# Patient Record
Sex: MALE | URBAN - METROPOLITAN AREA
[De-identification: names, ages, dates, MRNs, and addresses within clinical notes are randomized per-mention and may not be internally consistent; named-entity substitution may affect disease eponyms.]

---

## 2017-12-26 ENCOUNTER — OUTPATIENT (OUTPATIENT)
Dept: OUTPATIENT SERVICES | Facility: HOSPITAL | Age: 62
LOS: 1 days | Discharge: HOME | End: 2017-12-26
Payer: COMMERCIAL

## 2017-12-26 DIAGNOSIS — K43.9 VENTRAL HERNIA WITHOUT OBSTRUCTION OR GANGRENE: ICD-10-CM

## 2017-12-26 DIAGNOSIS — Z87.19 PERSONAL HISTORY OF OTHER DISEASES OF THE DIGESTIVE SYSTEM: Chronic | ICD-10-CM

## 2017-12-26 DIAGNOSIS — Z98.890 OTHER SPECIFIED POSTPROCEDURAL STATES: Chronic | ICD-10-CM

## 2017-12-26 LAB
ANION GAP SERPL CALC-SCNC: 7 MMOL/L — SIGNIFICANT CHANGE UP (ref 5–17)
APTT BLD: 33.3 SEC — SIGNIFICANT CHANGE UP (ref 27.5–37.4)
BASOPHILS # BLD AUTO: 0.1 K/UL — SIGNIFICANT CHANGE UP (ref 0–0.2)
BASOPHILS NFR BLD AUTO: 1.1 % — SIGNIFICANT CHANGE UP (ref 0–2)
BUN SERPL-MCNC: 17 MG/DL — SIGNIFICANT CHANGE UP (ref 7–23)
CALCIUM SERPL-MCNC: 9 MG/DL — SIGNIFICANT CHANGE UP (ref 8.5–10.1)
CHLORIDE SERPL-SCNC: 105 MMOL/L — SIGNIFICANT CHANGE UP (ref 96–108)
CO2 SERPL-SCNC: 28 MMOL/L — SIGNIFICANT CHANGE UP (ref 22–31)
CREAT SERPL-MCNC: 0.79 MG/DL — SIGNIFICANT CHANGE UP (ref 0.5–1.3)
EOSINOPHIL # BLD AUTO: 0.4 K/UL — SIGNIFICANT CHANGE UP (ref 0–0.5)
EOSINOPHIL NFR BLD AUTO: 7.1 % — HIGH (ref 0–6)
GLUCOSE SERPL-MCNC: 103 MG/DL — HIGH (ref 70–99)
HCT VFR BLD CALC: 44.4 % — SIGNIFICANT CHANGE UP (ref 39–50)
HGB BLD-MCNC: 15.1 G/DL — SIGNIFICANT CHANGE UP (ref 13–17)
INR BLD: 1.04 RATIO — SIGNIFICANT CHANGE UP (ref 0.88–1.16)
LYMPHOCYTES # BLD AUTO: 1.5 K/UL — SIGNIFICANT CHANGE UP (ref 1–3.3)
LYMPHOCYTES # BLD AUTO: 29.4 % — SIGNIFICANT CHANGE UP (ref 13–44)
MCHC RBC-ENTMCNC: 32.4 PG — SIGNIFICANT CHANGE UP (ref 27–34)
MCHC RBC-ENTMCNC: 34 GM/DL — SIGNIFICANT CHANGE UP (ref 32–36)
MCV RBC AUTO: 95.4 FL — SIGNIFICANT CHANGE UP (ref 80–100)
MONOCYTES # BLD AUTO: 0.4 K/UL — SIGNIFICANT CHANGE UP (ref 0–0.9)
MONOCYTES NFR BLD AUTO: 7.8 % — SIGNIFICANT CHANGE UP (ref 2–14)
NEUTROPHILS # BLD AUTO: 2.8 K/UL — SIGNIFICANT CHANGE UP (ref 1.8–7.4)
NEUTROPHILS NFR BLD AUTO: 54.6 % — SIGNIFICANT CHANGE UP (ref 43–77)
PLATELET # BLD AUTO: 143 K/UL — LOW (ref 150–400)
POTASSIUM SERPL-MCNC: 4.1 MMOL/L — SIGNIFICANT CHANGE UP (ref 3.5–5.3)
POTASSIUM SERPL-SCNC: 4.1 MMOL/L — SIGNIFICANT CHANGE UP (ref 3.5–5.3)
PROTHROM AB SERPL-ACNC: 11.2 SEC — SIGNIFICANT CHANGE UP (ref 9.8–12.7)
RBC # BLD: 4.65 M/UL — SIGNIFICANT CHANGE UP (ref 4.2–5.8)
RBC # FLD: 10.8 % — SIGNIFICANT CHANGE UP (ref 10.3–14.5)
SODIUM SERPL-SCNC: 140 MMOL/L — SIGNIFICANT CHANGE UP (ref 135–145)
WBC # BLD: 5.1 K/UL — SIGNIFICANT CHANGE UP (ref 3.8–10.5)
WBC # FLD AUTO: 5.1 K/UL — SIGNIFICANT CHANGE UP (ref 3.8–10.5)

## 2017-12-26 PROCEDURE — 93010 ELECTROCARDIOGRAM REPORT: CPT

## 2017-12-26 NOTE — CHART NOTE - NSCHARTNOTEFT_GEN_A_CORE
Plan  1. Stop all NSAIDS, herbal supplements and vitamins for 7 days.  2. NPO at midnight.  3. Take the following medications (losartan, metoprolol) with small sips of water on the morning of your procedure/surgery.  4. Use EZ sponges as directed  5. Use mupirocin as directed

## 2018-01-11 ENCOUNTER — OUTPATIENT (OUTPATIENT)
Dept: OUTPATIENT SERVICES | Facility: HOSPITAL | Age: 63
LOS: 1 days | Discharge: HOME | End: 2018-01-11

## 2018-01-11 VITALS
RESPIRATION RATE: 16 BRPM | TEMPERATURE: 98 F | DIASTOLIC BLOOD PRESSURE: 85 MMHG | SYSTOLIC BLOOD PRESSURE: 140 MMHG | HEART RATE: 74 BPM | WEIGHT: 229.94 LBS | HEIGHT: 75 IN | OXYGEN SATURATION: 97 %

## 2018-01-11 VITALS
SYSTOLIC BLOOD PRESSURE: 132 MMHG | DIASTOLIC BLOOD PRESSURE: 69 MMHG | HEART RATE: 59 BPM | OXYGEN SATURATION: 100 % | RESPIRATION RATE: 16 BRPM

## 2018-01-11 DIAGNOSIS — Z98.890 OTHER SPECIFIED POSTPROCEDURAL STATES: Chronic | ICD-10-CM

## 2018-01-11 DIAGNOSIS — Z87.19 PERSONAL HISTORY OF OTHER DISEASES OF THE DIGESTIVE SYSTEM: Chronic | ICD-10-CM

## 2018-01-11 RX ORDER — ONDANSETRON 8 MG/1
4 TABLET, FILM COATED ORAL ONCE
Qty: 0 | Refills: 0 | Status: DISCONTINUED | OUTPATIENT
Start: 2018-01-11 | End: 2018-01-11

## 2018-01-11 RX ORDER — FENTANYL CITRATE 50 UG/ML
50 INJECTION INTRAVENOUS
Qty: 0 | Refills: 0 | Status: DISCONTINUED | OUTPATIENT
Start: 2018-01-11 | End: 2018-01-11

## 2018-01-11 RX ORDER — OXYCODONE HYDROCHLORIDE 5 MG/1
1 TABLET ORAL
Qty: 30 | Refills: 0
Start: 2018-01-11 | End: 2018-01-15

## 2018-01-11 RX ORDER — OXYCODONE HYDROCHLORIDE 5 MG/1
10 TABLET ORAL EVERY 6 HOURS
Qty: 0 | Refills: 0 | Status: DISCONTINUED | OUTPATIENT
Start: 2018-01-11 | End: 2018-01-11

## 2018-01-11 RX ORDER — OXYCODONE HYDROCHLORIDE 5 MG/1
5 TABLET ORAL EVERY 4 HOURS
Qty: 0 | Refills: 0 | Status: DISCONTINUED | OUTPATIENT
Start: 2018-01-11 | End: 2018-01-11

## 2018-01-11 RX ORDER — SODIUM CHLORIDE 9 MG/ML
1000 INJECTION, SOLUTION INTRAVENOUS
Qty: 0 | Refills: 0 | Status: DISCONTINUED | OUTPATIENT
Start: 2018-01-11 | End: 2018-01-11

## 2018-01-11 NOTE — ASU DISCHARGE PLAN (ADULT/PEDIATRIC). - MEDICATION SUMMARY - MEDICATIONS TO TAKE
I will START or STAY ON the medications listed below when I get home from the hospital:    oxyCODONE 5 mg oral tablet  -- 1 tab(s) by mouth every 4 hours, As Needed -for moderate pain MDD:6  -- Caution federal law prohibits the transfer of this drug to any person other  than the person for whom it was prescribed.  It is very important that you take or use this exactly as directed.  Do not skip doses or discontinue unless directed by your doctor.  May cause drowsiness.  Alcohol may intensify this effect.  Use care when operating dangerous machinery.  This prescription cannot be refilled.  Using more of this medication than prescribed may cause serious breathing problems.    -- Indication: For VENTRAL HERNIA WITHOUT OBSTRUCTION OR GANGRENE    losartan 50 mg oral tablet  -- 1 tab(s) by mouth once a day  -- Indication: For VENTRAL HERNIA WITHOUT OBSTRUCTION OR GANGRENE    simvastatin 20 mg oral tablet  -- 1 tab(s) by mouth once a day (at bedtime)  -- Indication: For VENTRAL HERNIA WITHOUT OBSTRUCTION OR GANGRENE    metoprolol tartrate 50 mg oral tablet  -- 1 tab(s) by mouth 2 times a day  -- Indication: For VENTRAL HERNIA WITHOUT OBSTRUCTION OR GANGRENE

## 2018-01-11 NOTE — BRIEF OPERATIVE NOTE - PROCEDURE
<<-----Click on this checkbox to enter Procedure Ventral hernia repair with mesh  01/11/2018    Active  RZINGALE

## 2018-01-16 DIAGNOSIS — K43.9 VENTRAL HERNIA WITHOUT OBSTRUCTION OR GANGRENE: ICD-10-CM

## 2018-01-16 DIAGNOSIS — E78.00 PURE HYPERCHOLESTEROLEMIA, UNSPECIFIED: ICD-10-CM

## 2018-01-16 DIAGNOSIS — Z88.8 ALLERGY STATUS TO OTHER DRUGS, MEDICAMENTS AND BIOLOGICAL SUBSTANCES: ICD-10-CM

## 2018-01-16 DIAGNOSIS — I10 ESSENTIAL (PRIMARY) HYPERTENSION: ICD-10-CM

## 2018-06-27 ENCOUNTER — RESULT REVIEW (OUTPATIENT)
Age: 63
End: 2018-06-27

## 2019-07-15 PROBLEM — I10 ESSENTIAL (PRIMARY) HYPERTENSION: Chronic | Status: ACTIVE | Noted: 2017-12-26

## 2019-07-15 PROBLEM — E78.00 PURE HYPERCHOLESTEROLEMIA, UNSPECIFIED: Chronic | Status: ACTIVE | Noted: 2017-12-26

## 2019-07-15 PROBLEM — Z00.00 ENCOUNTER FOR PREVENTIVE HEALTH EXAMINATION: Status: ACTIVE | Noted: 2019-07-15

## 2019-07-15 PROBLEM — L40.9 PSORIASIS, UNSPECIFIED: Chronic | Status: ACTIVE | Noted: 2017-12-26

## 2019-07-15 PROBLEM — K43.9 VENTRAL HERNIA WITHOUT OBSTRUCTION OR GANGRENE: Chronic | Status: ACTIVE | Noted: 2017-12-26

## 2019-08-27 ENCOUNTER — APPOINTMENT (OUTPATIENT)
Dept: GASTROENTEROLOGY | Facility: CLINIC | Age: 64
End: 2019-08-27
Payer: COMMERCIAL

## 2019-08-27 VITALS
HEIGHT: 72 IN | DIASTOLIC BLOOD PRESSURE: 82 MMHG | SYSTOLIC BLOOD PRESSURE: 139 MMHG | WEIGHT: 235 LBS | HEART RATE: 73 BPM | BODY MASS INDEX: 31.83 KG/M2

## 2019-08-27 PROCEDURE — 99212 OFFICE O/P EST SF 10 MIN: CPT

## 2019-09-05 NOTE — HISTORY OF PRESENT ILLNESS
[de-identified] : 65yo male with hx polyps\par Pt last year with large tubulovillous adenoma and other large polyps\par pt asymptomatic without bleeding or change in bowel habits

## 2019-09-05 NOTE — PHYSICAL EXAM
[Outer Ear] : the ears and nose were normal in appearance [Neck Cervical Mass (___cm)] : no neck mass was observed [Neck Appearance] : the appearance of the neck was normal [Oropharynx] : the oropharynx was normal [Thyroid Nodule] : there were no palpable thyroid nodules [Jugular Venous Distention Increased] : there was no jugular-venous distention [Thyroid Diffuse Enlargement] : the thyroid was not enlarged [Auscultation Breath Sounds / Voice Sounds] : lungs were clear to auscultation bilaterally [Heart Sounds Gallop] : no gallops [Heart Sounds] : normal S1 and S2 [Heart Rate And Rhythm] : heart rate was normal and rhythm regular [Heart Sounds Pericardial Friction Rub] : no pericardial rub [Murmurs] : no murmurs [Edema] : there was no peripheral edema [Full Pulse] : the pedal pulses are present [Bowel Sounds] : normal bowel sounds [] : no hepato-splenomegaly [Abdomen Soft] : soft [Abdomen Tenderness] : non-tender [Abdomen Mass (___ Cm)] : no abdominal mass palpated [Abnormal Walk] : normal gait [Motor Tone] : muscle strength and tone were normal [Musculoskeletal - Swelling] : no joint swelling seen [Nail Clubbing] : no clubbing  or cyanosis of the fingernails [Oriented To Time, Place, And Person] : oriented to person, place, and time [Impaired Insight] : insight and judgment were intact [Affect] : the affect was normal [General Appearance - Alert] : alert [General Appearance - In No Acute Distress] : in no acute distress [FreeTextEntry1] : psoriasis

## 2019-09-05 NOTE — ASSESSMENT
[FreeTextEntry1] : 63yo male with hx large tubulovillous adenoma and adenoma\par Will check colonoscopy\par Risks and benefits of procedure(s) discussed with patient in detail, including but not limited to, perforation, bleeding, reaction to anesthesia, missed lesions.\par

## 2019-09-06 ENCOUNTER — RESULT REVIEW (OUTPATIENT)
Age: 64
End: 2019-09-06

## 2019-09-06 ENCOUNTER — APPOINTMENT (OUTPATIENT)
Dept: GASTROENTEROLOGY | Facility: AMBULATORY MEDICAL SERVICES | Age: 64
End: 2019-09-06
Payer: COMMERCIAL

## 2019-09-06 PROCEDURE — 45385 COLONOSCOPY W/LESION REMOVAL: CPT

## 2019-09-06 PROCEDURE — 45380 COLONOSCOPY AND BIOPSY: CPT | Mod: 59

## 2020-05-06 ENCOUNTER — TRANSCRIPTION ENCOUNTER (OUTPATIENT)
Age: 65
End: 2020-05-06

## 2021-02-25 ENCOUNTER — NON-APPOINTMENT (OUTPATIENT)
Age: 66
End: 2021-02-25

## 2021-03-15 ENCOUNTER — APPOINTMENT (OUTPATIENT)
Dept: GASTROENTEROLOGY | Facility: CLINIC | Age: 66
End: 2021-03-15
Payer: COMMERCIAL

## 2021-03-15 VITALS
HEART RATE: 69 BPM | SYSTOLIC BLOOD PRESSURE: 170 MMHG | DIASTOLIC BLOOD PRESSURE: 90 MMHG | BODY MASS INDEX: 29.93 KG/M2 | HEIGHT: 72 IN | WEIGHT: 221 LBS | TEMPERATURE: 98 F

## 2021-03-15 DIAGNOSIS — Z12.11 ENCOUNTER FOR SCREENING FOR MALIGNANT NEOPLASM OF COLON: ICD-10-CM

## 2021-03-15 DIAGNOSIS — Z78.9 OTHER SPECIFIED HEALTH STATUS: ICD-10-CM

## 2021-03-15 DIAGNOSIS — Z82.49 FAMILY HISTORY OF ISCHEMIC HEART DISEASE AND OTHER DISEASES OF THE CIRCULATORY SYSTEM: ICD-10-CM

## 2021-03-15 DIAGNOSIS — Z86.79 PERSONAL HISTORY OF OTHER DISEASES OF THE CIRCULATORY SYSTEM: ICD-10-CM

## 2021-03-15 DIAGNOSIS — Z86.39 PERSONAL HISTORY OF OTHER ENDOCRINE, NUTRITIONAL AND METABOLIC DISEASE: ICD-10-CM

## 2021-03-15 PROCEDURE — 99212 OFFICE O/P EST SF 10 MIN: CPT

## 2021-03-15 PROCEDURE — 99072 ADDL SUPL MATRL&STAF TM PHE: CPT

## 2021-03-16 PROBLEM — Z86.79 HISTORY OF HYPERTENSION: Status: RESOLVED | Noted: 2021-03-15 | Resolved: 2021-03-16

## 2021-03-16 PROBLEM — Z82.49 FAMILY HISTORY OF HYPERTENSION: Status: ACTIVE | Noted: 2021-03-15

## 2021-03-16 PROBLEM — Z78.9 CAFFEINE USE: Status: ACTIVE | Noted: 2021-03-15

## 2021-03-16 PROBLEM — Z86.39 HISTORY OF HIGH CHOLESTEROL: Status: RESOLVED | Noted: 2021-03-15 | Resolved: 2021-03-16

## 2021-03-16 NOTE — ASSESSMENT
[FreeTextEntry1] : 64yo male with hx colon polyps\par \par Risks and benefits of procedure(s) discussed with patient in detail, including but not limited to, perforation, bleeding, reaction to anesthesia, missed lesions.\par Will continue antihypertensive meds through am of procedure

## 2021-03-16 NOTE — HISTORY OF PRESENT ILLNESS
[de-identified] : 64yo male for surveillance colonoscopy\par Patient with hx colon polyps on prior colonoscopy and due for surveillance colonoscopy\par Patient is asymptomatic without bleeding or change in bowel habits. he has hx mulitple large polyps removed piecemeal\par Pt with hypertension -

## 2021-03-27 ENCOUNTER — APPOINTMENT (OUTPATIENT)
Dept: DISASTER EMERGENCY | Facility: CLINIC | Age: 66
End: 2021-03-27

## 2021-03-27 DIAGNOSIS — Z01.818 ENCOUNTER FOR OTHER PREPROCEDURAL EXAMINATION: ICD-10-CM

## 2021-03-27 LAB — SARS-COV-2 N GENE NPH QL NAA+PROBE: NOT DETECTED

## 2021-03-30 ENCOUNTER — APPOINTMENT (OUTPATIENT)
Dept: GASTROENTEROLOGY | Facility: CLINIC | Age: 66
End: 2021-03-30
Payer: COMMERCIAL

## 2021-03-30 ENCOUNTER — RESULT REVIEW (OUTPATIENT)
Age: 66
End: 2021-03-30

## 2021-03-30 PROCEDURE — 45380 COLONOSCOPY AND BIOPSY: CPT

## 2021-03-30 PROCEDURE — 99072 ADDL SUPL MATRL&STAF TM PHE: CPT

## 2021-08-19 ENCOUNTER — TRANSCRIPTION ENCOUNTER (OUTPATIENT)
Age: 66
End: 2021-08-19

## 2021-09-08 ENCOUNTER — TRANSCRIPTION ENCOUNTER (OUTPATIENT)
Age: 66
End: 2021-09-08

## 2021-12-28 ENCOUNTER — OUTPATIENT (OUTPATIENT)
Dept: OUTPATIENT SERVICES | Facility: HOSPITAL | Age: 66
LOS: 1 days | End: 2021-12-28
Payer: COMMERCIAL

## 2021-12-28 DIAGNOSIS — Z87.19 PERSONAL HISTORY OF OTHER DISEASES OF THE DIGESTIVE SYSTEM: Chronic | ICD-10-CM

## 2021-12-28 DIAGNOSIS — Z20.828 CONTACT WITH AND (SUSPECTED) EXPOSURE TO OTHER VIRAL COMMUNICABLE DISEASES: ICD-10-CM

## 2021-12-28 DIAGNOSIS — Z98.890 OTHER SPECIFIED POSTPROCEDURAL STATES: Chronic | ICD-10-CM

## 2021-12-28 LAB — SARS-COV-2 RNA SPEC QL NAA+PROBE: SIGNIFICANT CHANGE UP

## 2021-12-28 PROCEDURE — C9803: CPT

## 2021-12-28 PROCEDURE — U0005: CPT

## 2021-12-28 PROCEDURE — U0003: CPT

## 2021-12-29 DIAGNOSIS — Z20.828 CONTACT WITH AND (SUSPECTED) EXPOSURE TO OTHER VIRAL COMMUNICABLE DISEASES: ICD-10-CM

## 2022-01-03 ENCOUNTER — APPOINTMENT (OUTPATIENT)
Dept: CARDIOLOGY | Facility: CLINIC | Age: 67
End: 2022-01-03
Payer: COMMERCIAL

## 2022-01-03 ENCOUNTER — NON-APPOINTMENT (OUTPATIENT)
Age: 67
End: 2022-01-03

## 2022-01-03 VITALS
DIASTOLIC BLOOD PRESSURE: 52 MMHG | HEIGHT: 72 IN | WEIGHT: 229 LBS | BODY MASS INDEX: 31.02 KG/M2 | HEART RATE: 101 BPM | SYSTOLIC BLOOD PRESSURE: 130 MMHG | OXYGEN SATURATION: 98 %

## 2022-01-03 DIAGNOSIS — E78.5 HYPERLIPIDEMIA, UNSPECIFIED: ICD-10-CM

## 2022-01-03 PROCEDURE — 93000 ELECTROCARDIOGRAM COMPLETE: CPT

## 2022-01-03 PROCEDURE — 99204 OFFICE O/P NEW MOD 45 MIN: CPT

## 2022-01-03 RX ORDER — APREMILAST 30 MG/1
30 TABLET, FILM COATED ORAL
Refills: 0 | Status: ACTIVE | COMMUNITY

## 2022-01-03 RX ORDER — ASPIRIN 81 MG
81 TABLET, DELAYED RELEASE (ENTERIC COATED) ORAL DAILY
Refills: 0 | Status: DISCONTINUED | COMMUNITY
End: 2022-01-03

## 2022-01-03 RX ORDER — SODIUM PICOSULFATE, MAGNESIUM OXIDE, AND ANHYDROUS CITRIC ACID 10; 3.5; 12 MG/160ML; G/160ML; G/160ML
10-3.5-12 MG-GM LIQUID ORAL
Qty: 2 | Refills: 0 | Status: DISCONTINUED | COMMUNITY
Start: 2019-08-27 | End: 2022-01-03

## 2022-01-03 RX ORDER — SIMVASTATIN 20 MG/1
20 TABLET, FILM COATED ORAL
Qty: 90 | Refills: 1 | Status: ACTIVE | COMMUNITY

## 2022-01-03 RX ORDER — SODIUM SULFATE, MAGNESIUM SULFATE, AND POTASSIUM CHLORIDE 17.75; 2.7; 2.25 G/1; G/1; G/1
1479-225-188 TABLET ORAL
Qty: 1 | Refills: 0 | Status: DISCONTINUED | COMMUNITY
Start: 2021-03-22 | End: 2022-01-03

## 2022-01-03 NOTE — DISCUSSION/SUMMARY
[FreeTextEntry1] : 66 year old patient with HTN but no prior cardiac history presents with rapid atrial fibrillation probably of several months duration.  CHADSVASC score of 2.  He will stop aspirin and start apixaban.  Metoprolol increased to 100 mg bid due to rapid rate.  Echo and TFT ordered.  EP eval for ASMITA cardioversion.

## 2022-01-03 NOTE — PHYSICAL EXAM

## 2022-01-03 NOTE — HISTORY OF PRESENT ILLNESS
[FreeTextEntry1] : 66 year old man with HTN and HLD.  Several months ago, noted palpitations and skipped beats that came and went.  Recently, these symptoms have been persistent.  He also has dyspnea walking up steps which is new.  No chest discomfort.

## 2022-01-04 ENCOUNTER — NON-APPOINTMENT (OUTPATIENT)
Age: 67
End: 2022-01-04

## 2022-01-07 ENCOUNTER — APPOINTMENT (OUTPATIENT)
Dept: ELECTROPHYSIOLOGY | Facility: CLINIC | Age: 67
End: 2022-01-07
Payer: COMMERCIAL

## 2022-01-07 VITALS
SYSTOLIC BLOOD PRESSURE: 132 MMHG | BODY MASS INDEX: 31.29 KG/M2 | HEIGHT: 72 IN | WEIGHT: 231 LBS | OXYGEN SATURATION: 97 % | DIASTOLIC BLOOD PRESSURE: 94 MMHG | HEART RATE: 99 BPM

## 2022-01-07 PROCEDURE — 93000 ELECTROCARDIOGRAM COMPLETE: CPT

## 2022-01-07 PROCEDURE — 99204 OFFICE O/P NEW MOD 45 MIN: CPT

## 2022-01-07 NOTE — DISCUSSION/SUMMARY
[FreeTextEntry1] : 66-year-old gentleman with history of recently persistent atrial fibrillation symptomatic with palpitations and exertional dyspnea patient has been compliant with eliquis 5 mg twice daily and continues to be on metoprolol 100 mg twice daily for rate control.\par Patient would benefit from rhythm control management reasonable to start amiodarone for short-term, DC cardioversion ASMITA guided, and plan for catheter ablation as a more effective therapy and avoid long-term amiodarone use.  \par Continue Eliquis, control HTN.  Weight loss aerobic exercise, reduce alcohol intake. \par \par Total length of time spent with this patient was 45 minutes and more then half of the time was spent face to face with the patient as well as counseling and coordination of care as stated above.\par

## 2022-01-07 NOTE — HISTORY OF PRESENT ILLNESS
[FreeTextEntry1] : 66-year-old male with history of hypertension recently diagnoses of persistent atrial fibrillation he had paroxysmal episodes of palpitations several months ago and the last 2 weeks symptoms are more persistent he also has exertional dyspnea.  Patient denies chest pain syncope dizziness.  Patient is on Eliquis 5 mg twice daily since 1/3/22. Denies any bleeding.  Also on metoprolol  mg twice daily for rate control. He drinks 2 glasses of wine daily. \par TSH wnl\par ecg 1.3.22 AF  bpm, narrow qrs

## 2022-01-13 RX ORDER — METOPROLOL TARTRATE 50 MG
1 TABLET ORAL
Qty: 0 | Refills: 0 | DISCHARGE

## 2022-01-13 RX ORDER — LOSARTAN POTASSIUM 100 MG/1
1 TABLET, FILM COATED ORAL
Qty: 0 | Refills: 0 | DISCHARGE

## 2022-01-14 ENCOUNTER — OUTPATIENT (OUTPATIENT)
Dept: OUTPATIENT SERVICES | Facility: HOSPITAL | Age: 67
LOS: 1 days | Discharge: HOME | End: 2022-01-14
Payer: COMMERCIAL

## 2022-01-14 VITALS
RESPIRATION RATE: 18 BRPM | DIASTOLIC BLOOD PRESSURE: 64 MMHG | SYSTOLIC BLOOD PRESSURE: 99 MMHG | OXYGEN SATURATION: 96 % | HEART RATE: 66 BPM

## 2022-01-14 VITALS
RESPIRATION RATE: 18 BRPM | SYSTOLIC BLOOD PRESSURE: 133 MMHG | OXYGEN SATURATION: 100 % | HEART RATE: 104 BPM | HEIGHT: 75 IN | DIASTOLIC BLOOD PRESSURE: 97 MMHG | TEMPERATURE: 98 F | WEIGHT: 227.96 LBS

## 2022-01-14 DIAGNOSIS — Z87.19 PERSONAL HISTORY OF OTHER DISEASES OF THE DIGESTIVE SYSTEM: Chronic | ICD-10-CM

## 2022-01-14 DIAGNOSIS — I48.91 UNSPECIFIED ATRIAL FIBRILLATION: ICD-10-CM

## 2022-01-14 DIAGNOSIS — Z98.890 OTHER SPECIFIED POSTPROCEDURAL STATES: Chronic | ICD-10-CM

## 2022-01-14 LAB
ANION GAP SERPL CALC-SCNC: 5 MMOL/L — SIGNIFICANT CHANGE UP (ref 5–17)
BUN SERPL-MCNC: 18 MG/DL — SIGNIFICANT CHANGE UP (ref 7–23)
CALCIUM SERPL-MCNC: 10 MG/DL — SIGNIFICANT CHANGE UP (ref 8.5–10.1)
CHLORIDE SERPL-SCNC: 103 MMOL/L — SIGNIFICANT CHANGE UP (ref 96–108)
CO2 SERPL-SCNC: 28 MMOL/L — SIGNIFICANT CHANGE UP (ref 22–31)
CREAT SERPL-MCNC: 0.9 MG/DL — SIGNIFICANT CHANGE UP (ref 0.5–1.3)
GLUCOSE SERPL-MCNC: 104 MG/DL — HIGH (ref 70–99)
HCT VFR BLD CALC: 46.3 % — SIGNIFICANT CHANGE UP (ref 39–50)
HGB BLD-MCNC: 15.9 G/DL — SIGNIFICANT CHANGE UP (ref 13–17)
MAGNESIUM SERPL-MCNC: 2.4 MG/DL — SIGNIFICANT CHANGE UP (ref 1.6–2.6)
MCHC RBC-ENTMCNC: 32.2 PG — SIGNIFICANT CHANGE UP (ref 27–34)
MCHC RBC-ENTMCNC: 34.3 GM/DL — SIGNIFICANT CHANGE UP (ref 32–36)
MCV RBC AUTO: 93.7 FL — SIGNIFICANT CHANGE UP (ref 80–100)
PLATELET # BLD AUTO: 191 K/UL — SIGNIFICANT CHANGE UP (ref 150–400)
POTASSIUM SERPL-MCNC: 4.6 MMOL/L — SIGNIFICANT CHANGE UP (ref 3.5–5.3)
POTASSIUM SERPL-SCNC: 4.6 MMOL/L — SIGNIFICANT CHANGE UP (ref 3.5–5.3)
RBC # BLD: 4.94 M/UL — SIGNIFICANT CHANGE UP (ref 4.2–5.8)
RBC # FLD: 11.7 % — SIGNIFICANT CHANGE UP (ref 10.3–14.5)
SODIUM SERPL-SCNC: 136 MMOL/L — SIGNIFICANT CHANGE UP (ref 135–145)
WBC # BLD: 7.23 K/UL — SIGNIFICANT CHANGE UP (ref 3.8–10.5)
WBC # FLD AUTO: 7.23 K/UL — SIGNIFICANT CHANGE UP (ref 3.8–10.5)

## 2022-01-14 PROCEDURE — 93320 DOPPLER ECHO COMPLETE: CPT

## 2022-01-14 PROCEDURE — 36415 COLL VENOUS BLD VENIPUNCTURE: CPT

## 2022-01-14 PROCEDURE — 93010 ELECTROCARDIOGRAM REPORT: CPT | Mod: 76

## 2022-01-14 PROCEDURE — 93312 ECHO TRANSESOPHAGEAL: CPT

## 2022-01-14 PROCEDURE — 92960 CARDIOVERSION ELECTRIC EXT: CPT

## 2022-01-14 PROCEDURE — 80048 BASIC METABOLIC PNL TOTAL CA: CPT

## 2022-01-14 PROCEDURE — 85027 COMPLETE CBC AUTOMATED: CPT

## 2022-01-14 PROCEDURE — 93325 DOPPLER ECHO COLOR FLOW MAPG: CPT

## 2022-01-14 PROCEDURE — 83735 ASSAY OF MAGNESIUM: CPT

## 2022-01-14 PROCEDURE — 93005 ELECTROCARDIOGRAM TRACING: CPT

## 2022-01-14 RX ORDER — METOPROLOL TARTRATE 50 MG
1 TABLET ORAL
Qty: 0 | Refills: 0 | DISCHARGE

## 2022-01-14 RX ORDER — METOPROLOL TARTRATE 50 MG
1 TABLET ORAL
Qty: 30 | Refills: 2
Start: 2022-01-14 | End: 2022-04-13

## 2022-01-14 RX ORDER — AMIODARONE HYDROCHLORIDE 400 MG/1
2 TABLET ORAL
Qty: 60 | Refills: 2
Start: 2022-01-14 | End: 2022-02-24

## 2022-01-14 NOTE — PROCEDURE NOTE - NSICDXPROCEDURE_GEN_ALL_CORE_FT
PROCEDURES:  Transesophageal echocardiography (ASMITA) with external cardioverison 14-Jan-2022 10:11:30  Abraham Soriano

## 2022-01-14 NOTE — PROCEDURE NOTE - ADDITIONAL PROCEDURE DETAILS
s/p ASMITA performed by  : [facility] LVEF, no evidence of LA/MYRON thrombus (full report to follow)  s/p successful DCCV to NSR.  - continue uninterrupted eliquis 5mg po BID  - decrease toprol to 25mg daily  - start amiodarone 400mg po daily for 2weeks then 200mg daily  - Black box warning of Amiodarone discussed with patient. Will need out patient monitoring of LFT's, TSH, Opthalmology evaluations and Pulmonary Function Tests.  - d/c home today, f/u with  on 2/14/22 @2:00pm  Plan d/w pt/.

## 2022-01-14 NOTE — PACU DISCHARGE NOTE - COMMENTS
Patient s/p ASMITA/CV. VS stable. Patient awake and alert at this time and denies pain. Patient verbalizes understanding of discharge instructions. Pending transport to the lobby at this time to meet his wife who will drive him home

## 2022-01-14 NOTE — PROCEDURAL SAFETY CHECKLIST WITH OR WITHOUT SEDATION - NSPOSTCOMMENTFT_GEN_ALL_CORE
Brief at   . Time out at   .Anesthesia at   . Probe in at   . Bubble Study at   . Probe out   . Cardioversion at   . Anesthesia end   . Brief at  0934. Time out at 0936. Anesthesia at 0939.  Probe in at 0943.  Bubble Study at 0954 . Probe out 1003. Cardioversion x1 at 200J at 1004. Patient was in AFib and converted to NSR. Anesthesia ended at 1010.

## 2022-01-20 DIAGNOSIS — E78.00 PURE HYPERCHOLESTEROLEMIA, UNSPECIFIED: ICD-10-CM

## 2022-01-20 DIAGNOSIS — I48.19 OTHER PERSISTENT ATRIAL FIBRILLATION: ICD-10-CM

## 2022-01-20 DIAGNOSIS — I10 ESSENTIAL (PRIMARY) HYPERTENSION: ICD-10-CM

## 2022-01-20 DIAGNOSIS — I48.92 UNSPECIFIED ATRIAL FLUTTER: ICD-10-CM

## 2022-01-20 DIAGNOSIS — Z79.01 LONG TERM (CURRENT) USE OF ANTICOAGULANTS: ICD-10-CM

## 2022-01-20 DIAGNOSIS — I25.10 ATHEROSCLEROTIC HEART DISEASE OF NATIVE CORONARY ARTERY WITHOUT ANGINA PECTORIS: ICD-10-CM

## 2022-01-20 DIAGNOSIS — I34.0 NONRHEUMATIC MITRAL (VALVE) INSUFFICIENCY: ICD-10-CM

## 2022-01-20 DIAGNOSIS — Z86.010 PERSONAL HISTORY OF COLONIC POLYPS: ICD-10-CM

## 2022-01-30 ENCOUNTER — NON-APPOINTMENT (OUTPATIENT)
Age: 67
End: 2022-01-30

## 2022-01-31 RX ORDER — METOPROLOL SUCCINATE 100 MG/1
100 TABLET, EXTENDED RELEASE ORAL
Qty: 180 | Refills: 3 | Status: COMPLETED | COMMUNITY
End: 2022-01-31

## 2022-02-10 ENCOUNTER — NON-APPOINTMENT (OUTPATIENT)
Age: 67
End: 2022-02-10

## 2022-02-10 ENCOUNTER — APPOINTMENT (OUTPATIENT)
Dept: ELECTROPHYSIOLOGY | Facility: CLINIC | Age: 67
End: 2022-02-10
Payer: COMMERCIAL

## 2022-02-10 VITALS
DIASTOLIC BLOOD PRESSURE: 81 MMHG | BODY MASS INDEX: 29.93 KG/M2 | OXYGEN SATURATION: 97 % | WEIGHT: 221 LBS | HEART RATE: 109 BPM | SYSTOLIC BLOOD PRESSURE: 121 MMHG | HEIGHT: 72 IN

## 2022-02-10 PROCEDURE — 99214 OFFICE O/P EST MOD 30 MIN: CPT

## 2022-02-10 PROCEDURE — 93000 ELECTROCARDIOGRAM COMPLETE: CPT

## 2022-02-10 NOTE — HISTORY OF PRESENT ILLNESS
[FreeTextEntry1] : 66-year-old male with history of hypertension recently diagnoses of persistent atrial fibrillation he had paroxysmal episodes of palpitations several months ago and the last 2 weeks symptoms are more persistent he also has exertional dyspnea.  Patient denies chest pain syncope dizziness.  Patient is on Eliquis 5 mg twice daily since 1/3/22. He underwent ASMITA guided DCCV and started on amiodarone on 1/14/2022. Denies any bleeding.  Also on metoprolol XL. He reduced drinking alcohol and had weight loss 10lb. He feels well, he may have some episodes of SR although today in AF\par TSH wnl\par ecg 2.10.22 Afib 100 bpm\par ecg 1.3.22 AF  bpm, narrow qrs

## 2022-02-10 NOTE — DISCUSSION/SUMMARY
[FreeTextEntry1] : 66-year-old gentleman with history of recently persistent atrial fibrillation symptomatic with palpitations and exertional dyspnea patient has been compliant with eliquis 5 mg twice daily s/p dcccv and amiodarone since 1/14/22. Has breakthrough AF. Rhythm control is beneficial.  \par Patient elects to undergo catheter ablation as a more effective therapy and avoid long-term amiodarone use.  \par Continue Eliquis, control HTN. Increase toprolol to 50mg daily. Weight loss aerobic exercise, reduce alcohol intake. \par hold eliquis and HTN meds day of procedure\par Total length of time spent with this patient was 30 minutes and more then half of the time was spent face to face with the patient as well as counseling and coordination of care as stated above.\par

## 2022-03-29 ENCOUNTER — OUTPATIENT (OUTPATIENT)
Dept: OUTPATIENT SERVICES | Facility: HOSPITAL | Age: 67
LOS: 1 days | End: 2022-03-29
Payer: COMMERCIAL

## 2022-03-29 VITALS
HEART RATE: 56 BPM | SYSTOLIC BLOOD PRESSURE: 130 MMHG | DIASTOLIC BLOOD PRESSURE: 66 MMHG | HEIGHT: 74 IN | OXYGEN SATURATION: 97 % | RESPIRATION RATE: 17 BRPM | TEMPERATURE: 98 F | WEIGHT: 227.08 LBS

## 2022-03-29 DIAGNOSIS — Z98.890 OTHER SPECIFIED POSTPROCEDURAL STATES: Chronic | ICD-10-CM

## 2022-03-29 DIAGNOSIS — Z13.89 ENCOUNTER FOR SCREENING FOR OTHER DISORDER: ICD-10-CM

## 2022-03-29 DIAGNOSIS — Z01.818 ENCOUNTER FOR OTHER PREPROCEDURAL EXAMINATION: ICD-10-CM

## 2022-03-29 DIAGNOSIS — I48.91 UNSPECIFIED ATRIAL FIBRILLATION: ICD-10-CM

## 2022-03-29 DIAGNOSIS — Z29.9 ENCOUNTER FOR PROPHYLACTIC MEASURES, UNSPECIFIED: ICD-10-CM

## 2022-03-29 DIAGNOSIS — Z87.19 PERSONAL HISTORY OF OTHER DISEASES OF THE DIGESTIVE SYSTEM: Chronic | ICD-10-CM

## 2022-03-29 DIAGNOSIS — I10 ESSENTIAL (PRIMARY) HYPERTENSION: ICD-10-CM

## 2022-03-29 LAB
ANION GAP SERPL CALC-SCNC: 12 MMOL/L — SIGNIFICANT CHANGE UP (ref 5–17)
APTT BLD: 39.9 SEC — HIGH (ref 27.5–35.5)
BASOPHILS # BLD AUTO: 0.02 K/UL — SIGNIFICANT CHANGE UP (ref 0–0.2)
BASOPHILS NFR BLD AUTO: 0.3 % — SIGNIFICANT CHANGE UP (ref 0–2)
BLD GP AB SCN SERPL QL: SIGNIFICANT CHANGE UP
BUN SERPL-MCNC: 14 MG/DL — SIGNIFICANT CHANGE UP (ref 8–20)
CALCIUM SERPL-MCNC: 9.8 MG/DL — SIGNIFICANT CHANGE UP (ref 8.6–10.2)
CHLORIDE SERPL-SCNC: 100 MMOL/L — SIGNIFICANT CHANGE UP (ref 98–107)
CO2 SERPL-SCNC: 28 MMOL/L — SIGNIFICANT CHANGE UP (ref 22–29)
CREAT SERPL-MCNC: 0.68 MG/DL — SIGNIFICANT CHANGE UP (ref 0.5–1.3)
EGFR: 101 ML/MIN/1.73M2 — SIGNIFICANT CHANGE UP
EOSINOPHIL # BLD AUTO: 0.3 K/UL — SIGNIFICANT CHANGE UP (ref 0–0.5)
EOSINOPHIL NFR BLD AUTO: 4.5 % — SIGNIFICANT CHANGE UP (ref 0–6)
GLUCOSE SERPL-MCNC: 99 MG/DL — SIGNIFICANT CHANGE UP (ref 70–99)
HCT VFR BLD CALC: 43.4 % — SIGNIFICANT CHANGE UP (ref 39–50)
HGB BLD-MCNC: 14.5 G/DL — SIGNIFICANT CHANGE UP (ref 13–17)
IMM GRANULOCYTES NFR BLD AUTO: 0.3 % — SIGNIFICANT CHANGE UP (ref 0–1.5)
INR BLD: 1.26 RATIO — HIGH (ref 0.88–1.16)
LYMPHOCYTES # BLD AUTO: 2 K/UL — SIGNIFICANT CHANGE UP (ref 1–3.3)
LYMPHOCYTES # BLD AUTO: 29.9 % — SIGNIFICANT CHANGE UP (ref 13–44)
MCHC RBC-ENTMCNC: 31.8 PG — SIGNIFICANT CHANGE UP (ref 27–34)
MCHC RBC-ENTMCNC: 33.4 GM/DL — SIGNIFICANT CHANGE UP (ref 32–36)
MCV RBC AUTO: 95.2 FL — SIGNIFICANT CHANGE UP (ref 80–100)
MONOCYTES # BLD AUTO: 0.51 K/UL — SIGNIFICANT CHANGE UP (ref 0–0.9)
MONOCYTES NFR BLD AUTO: 7.6 % — SIGNIFICANT CHANGE UP (ref 2–14)
NEUTROPHILS # BLD AUTO: 3.83 K/UL — SIGNIFICANT CHANGE UP (ref 1.8–7.4)
NEUTROPHILS NFR BLD AUTO: 57.4 % — SIGNIFICANT CHANGE UP (ref 43–77)
PLATELET # BLD AUTO: 169 K/UL — SIGNIFICANT CHANGE UP (ref 150–400)
POTASSIUM SERPL-MCNC: 4.5 MMOL/L — SIGNIFICANT CHANGE UP (ref 3.5–5.3)
POTASSIUM SERPL-SCNC: 4.5 MMOL/L — SIGNIFICANT CHANGE UP (ref 3.5–5.3)
PROTHROM AB SERPL-ACNC: 14.7 SEC — HIGH (ref 10.5–13.4)
RBC # BLD: 4.56 M/UL — SIGNIFICANT CHANGE UP (ref 4.2–5.8)
RBC # FLD: 12.3 % — SIGNIFICANT CHANGE UP (ref 10.3–14.5)
SODIUM SERPL-SCNC: 140 MMOL/L — SIGNIFICANT CHANGE UP (ref 135–145)
WBC # BLD: 6.68 K/UL — SIGNIFICANT CHANGE UP (ref 3.8–10.5)
WBC # FLD AUTO: 6.68 K/UL — SIGNIFICANT CHANGE UP (ref 3.8–10.5)

## 2022-03-29 PROCEDURE — G0463: CPT

## 2022-03-29 PROCEDURE — 93005 ELECTROCARDIOGRAM TRACING: CPT

## 2022-03-29 PROCEDURE — 93010 ELECTROCARDIOGRAM REPORT: CPT

## 2022-03-29 NOTE — H&P PST ADULT - PROBLEM SELECTOR PLAN 1
scheduled for Afib ablation with Dr. Maddie Arceo on 4/7/2022.     -Verbalized understanding of all PST instructions  - Will hold eliquis and antihypertensive medications the morning of procedure as noted by Dr. Perkins's instructions  -NPO after midnight except for meds the night before  -Tylenol if needed 1 week before procedure

## 2022-03-29 NOTE — H&P PST ADULT - PROBLEM SELECTOR PLAN 2
-Follows with PCP  -Will  hold medications the morning of procedure as noted by Dr. Perkins's instructions

## 2022-03-29 NOTE — H&P PST ADULT - MUSCULOSKELETAL
Oriented - self; Oriented - place; Oriented - time negative No joint pain, swelling or deformity; no limitation of movement

## 2022-03-29 NOTE — H&P PST ADULT - NSANTHOSAYNRD_GEN_A_CORE
No. FRANCIA screening performed.  STOP BANG Legend: 0-2 = LOW Risk; 3-4 = INTERMEDIATE Risk; 5-8 = HIGH Risk

## 2022-03-29 NOTE — H&P PST ADULT - NSICDXPASTMEDICALHX_GEN_ALL_CORE_FT
PAST MEDICAL HISTORY:  Afib     Epigastric hernia     HTN (hypertension)     Hypercholesterolemia     Psoriasis     Psoriasis      PAST MEDICAL HISTORY:  Afib     At risk for sleep apnea Bang score 3    Epigastric hernia     HTN (hypertension)     Hypercholesterolemia     Psoriasis     Psoriasis

## 2022-03-29 NOTE — H&P PST ADULT - HISTORY OF PRESENT ILLNESS
Reports having palpitations all year long in 2021 and disappeared. Reports that December 2021, palpitations and feeling of irregular heartbeat that remains throughout an entire day. /u with a a cardiologist who noted that he had a fib and was referred to Dr. Perkins. Started on eliquis twice per day since 1/2022 and amiodarone 1/14/2022. Denies any bleeding, dizziness, lightehdness. reports that he has been feeling much improvement since started amiodarone.  65 y/o male with PMH of HLD, HTN, epigastric hernia, psoriasis, Afib presents to PST today pending Afib ablation with Dr. Maddie Arceo on 4/7/2022. Reports having palpitations all year long in 2021 for short period of timewhich suddenly disappeared. Reports that on December 2021, palpitations and feeling of irregular heartbeat remains throughout an entire day and he f /u with a cardiologist who noted that he had a fib. Patient was then referred to Dr. Perkins. Started on eliquis twice per day since 1/2022 and amiodarone 1/14/2022. Denies any bleeding, dizziness, lightheadedness, SOB.  Reports that he has been feeling much improvement since started amiodarone.     EKG 3/29/2022- sinus bradycardia  Echocardiogram (date): 1/14/2022-Normal left ventricular systolic function, No thrombus in the left atrial appendage, MYRON velocity was 30 cm/sec, left atrium appears normal   Stress Test (date):N/a  Cardiac CT or MRI (date): n/a  Cardiac Cath (date): n/a  Cardiac surgery (date): 4/7/2022

## 2022-03-29 NOTE — H&P PST ADULT - ASSESSMENT
65 y/o male scheduled for Afib ablation with Dr. Maddie Arceo on 2022.     -Verbalized understanding of all PST instructions  - Will hold eliquis and antihypertensive medications the morning of procedure as noted by Dr. Perkins's instructions  -NPO after midnight except for meds the night before  -Tylenol if needed 1 week before procedure    OPIOID RISK TOOL    LORRIE EACH BOX THAT APPLIES AND ADD TOTALS AT THE END    FAMILY HISTORY OF SUBSTANCE ABUSE                 FEMALE         MALE                                                Alcohol                             [  ]1 pt          [  ]3pts                                               Illegal Durgs                     [  ]2 pts        [  ]3pts                                               Rx Drugs                           [  ]4 pts        [  ]4 pts    PERSONAL HISTORY OF SUBSTANCE ABUSE                                                                                          Alcohol                             [  ]3 pts       [  ]3 pts                                               Illegal Drugs                     [  ]4 pts        [  ]4 pts                                               Rx Drugs                           [  ]5 pts        [  ]5 pts    AGE BETWEEN 16-45 YEARS                                      [  ]1 pt         [  ]1 pt    HISTORY OF PREADOLESCENT   SEXUAL ABUSE                                                             [  ]3 pts        [  ]0pts    PSYCHOLOGICAL DISEASE                     ADD, OCD, Bipolar, Schizophrenia        [  ]2 pts         [  ]2 pts                      Depression                                               [  ]1 pt           [  ]1 pt           SCORING TOTAL   (add numbers and type here)              (0)                                     A score of 3 or lower indicated LOW risk for future opioid abuse  A score of 4 to 7 indicated moderate risk for future opioid abuse  A score of 8 or higher indicates a high risk for opioid abuse    CAPRINI SCORE [CLOT]    AGE RELATED RISK FACTORS                                                       MOBILITY RELATED FACTORS  [ ] Age 41-60 years                                            (1 Point)                  [ ] Bed rest                                                        (1 Point)  [x ] Age: 61-74 years                                           (2 Points)                 [ ] Plaster cast                                                   (2 Points)  [ ] Age= 75 years                                              (3 Points)                 [ ] Bed bound for more than 72 hours                 (2 Points)    DISEASE RELATED RISK FACTORS                                               GENDER SPECIFIC FACTORS  [ ] Edema in the lower extremities                       (1 Point)                  [ ] Pregnancy                                                     (1 Point)  [ ] Varicose veins                                               (1 Point)                  [ ] Post-partum < 6 weeks                                   (1 Point)             [ x] BMI > 25 Kg/m2                                            (1 Point)                  [ ] Hormonal therapy  or oral contraception          (1 Point)                 [ ] Sepsis (in the previous month)                        (1 Point)                  [ ] History of pregnancy complications                 (1 point)  [ ] Pneumonia or serious lung disease                                               [ ] Unexplained or recurrent                     (1 Point)           (in the previous month)                               (1 Point)  [ ] Abnormal pulmonary function test                     (1 Point)                 SURGERY RELATED RISK FACTORS  [ ] Acute myocardial infarction                              (1 Point)                 [ ]  Section                                             (1 Point)  [ ] Congestive heart failure (in the previous month)  (1 Point)               [ ] Minor surgery                                                  (1 Point)   [ ] Inflammatory bowel disease                             (1 Point)                 [ ] Arthroscopic surgery                                        (2 Points)  [ ] Central venous access                                      (2 Points)                [ x] General surgery lasting more than 45 minutes   (2 Points)       [ ] Stroke (in the previous month)                          (5 Points)               [ ] Elective arthroplasty                                         (5 Points)                                                                                                                                               HEMATOLOGY RELATED FACTORS                                                 TRAUMA RELATED RISK FACTORS  [ ] Prior episodes of VTE                                     (3 Points)                [ ] Fracture of the hip, pelvis, or leg                       (5 Points)  [ ] Positive family history for VTE                         (3 Points)                 [ ] Acute spinal cord injury (in the previous month)  (5 Points)  [ ] Prothrombin 93209 A                                     (3 Points)                 [ ] Paralysis  (less than 1 month)                             (5 Points)  [ ] Factor V Leiden                                             (3 Points)                  [ ] Multiple Trauma within 1 month                        (5 Points)  [ ] Lupus anticoagulants                                     (3 Points)                                                           [ ] Anticardiolipin antibodies                               (3 Points)                                                       [ ] High homocysteine in the blood                      (3 Points)                                             [ ] Other congenital or acquired thrombophilia      (3 Points)                                                [ ] Heparin induced thrombocytopenia                  (3 Points)                                          Total Score [  5        ]    Caprini Score 0 - 2:  Low Risk, No VTE Prophylaxis required for most patients, encourage ambulation  Caprini Score 3 - 6:  At Risk, pharmacologic VTE prophylaxis is indicated for most patients (in the absence of a contraindication)  Caprini Score Greater than or = 7:  High Risk, pharmacologic VTE prophylaxis is indicated for most patients (in the absence of a contraindication)

## 2022-03-29 NOTE — H&P PST ADULT - PROBLEM SELECTOR PLAN 4
Caprini score 5- Moderate risk  Surgical team assess/recommend mechanical/pharmacological measures for VTE prophylaxis

## 2022-04-07 ENCOUNTER — INPATIENT (INPATIENT)
Facility: HOSPITAL | Age: 67
LOS: 0 days | Discharge: HOME | DRG: 274 | End: 2022-04-08
Attending: INTERNAL MEDICINE | Admitting: INTERNAL MEDICINE
Payer: COMMERCIAL

## 2022-04-07 ENCOUNTER — TRANSCRIPTION ENCOUNTER (OUTPATIENT)
Age: 67
End: 2022-04-07

## 2022-04-07 VITALS
OXYGEN SATURATION: 99 % | HEART RATE: 63 BPM | TEMPERATURE: 98 F | RESPIRATION RATE: 16 BRPM | DIASTOLIC BLOOD PRESSURE: 70 MMHG | SYSTOLIC BLOOD PRESSURE: 162 MMHG

## 2022-04-07 DIAGNOSIS — Z98.890 OTHER SPECIFIED POSTPROCEDURAL STATES: Chronic | ICD-10-CM

## 2022-04-07 DIAGNOSIS — I48.91 UNSPECIFIED ATRIAL FIBRILLATION: ICD-10-CM

## 2022-04-07 DIAGNOSIS — Z87.19 PERSONAL HISTORY OF OTHER DISEASES OF THE DIGESTIVE SYSTEM: Chronic | ICD-10-CM

## 2022-04-07 LAB — ABO RH CONFIRMATION: SIGNIFICANT CHANGE UP

## 2022-04-07 RX ORDER — COLCHICINE 0.6 MG
0.6 TABLET ORAL DAILY
Refills: 0 | Status: DISCONTINUED | OUTPATIENT
Start: 2022-04-07 | End: 2022-04-08

## 2022-04-07 RX ORDER — HYDROCHLOROTHIAZIDE 25 MG
12.5 TABLET ORAL DAILY
Refills: 0 | Status: DISCONTINUED | OUTPATIENT
Start: 2022-04-07 | End: 2022-04-08

## 2022-04-07 RX ORDER — ACETAMINOPHEN 500 MG
650 TABLET ORAL EVERY 6 HOURS
Refills: 0 | Status: DISCONTINUED | OUTPATIENT
Start: 2022-04-07 | End: 2022-04-08

## 2022-04-07 RX ORDER — ALPRAZOLAM 0.25 MG
0.25 TABLET ORAL EVERY 6 HOURS
Refills: 0 | Status: DISCONTINUED | OUTPATIENT
Start: 2022-04-07 | End: 2022-04-08

## 2022-04-07 RX ORDER — LOSARTAN POTASSIUM 100 MG/1
50 TABLET, FILM COATED ORAL DAILY
Refills: 0 | Status: DISCONTINUED | OUTPATIENT
Start: 2022-04-07 | End: 2022-04-08

## 2022-04-07 RX ORDER — METOPROLOL TARTRATE 50 MG
25 TABLET ORAL DAILY
Refills: 0 | Status: DISCONTINUED | OUTPATIENT
Start: 2022-04-07 | End: 2022-04-08

## 2022-04-07 RX ORDER — PANTOPRAZOLE SODIUM 20 MG/1
40 TABLET, DELAYED RELEASE ORAL
Refills: 0 | Status: DISCONTINUED | OUTPATIENT
Start: 2022-04-07 | End: 2022-04-08

## 2022-04-07 RX ORDER — SIMVASTATIN 20 MG/1
20 TABLET, FILM COATED ORAL AT BEDTIME
Refills: 0 | Status: DISCONTINUED | OUTPATIENT
Start: 2022-04-07 | End: 2022-04-08

## 2022-04-07 RX ORDER — AMIODARONE HYDROCHLORIDE 400 MG/1
100 TABLET ORAL DAILY
Refills: 0 | Status: DISCONTINUED | OUTPATIENT
Start: 2022-04-07 | End: 2022-04-08

## 2022-04-07 RX ORDER — BENZOCAINE AND MENTHOL 5; 1 G/100ML; G/100ML
1 LIQUID ORAL
Refills: 0 | Status: DISCONTINUED | OUTPATIENT
Start: 2022-04-07 | End: 2022-04-08

## 2022-04-07 RX ORDER — APIXABAN 2.5 MG/1
5 TABLET, FILM COATED ORAL
Refills: 0 | Status: DISCONTINUED | OUTPATIENT
Start: 2022-04-07 | End: 2022-04-08

## 2022-04-07 RX ORDER — SUCRALFATE 1 G
1 TABLET ORAL
Refills: 0 | Status: DISCONTINUED | OUTPATIENT
Start: 2022-04-07 | End: 2022-04-08

## 2022-04-07 RX ADMIN — Medication 650 MILLIGRAM(S): at 20:00

## 2022-04-07 RX ADMIN — APIXABAN 5 MILLIGRAM(S): 2.5 TABLET, FILM COATED ORAL at 22:07

## 2022-04-07 RX ADMIN — SIMVASTATIN 20 MILLIGRAM(S): 20 TABLET, FILM COATED ORAL at 22:07

## 2022-04-07 RX ADMIN — PANTOPRAZOLE SODIUM 40 MILLIGRAM(S): 20 TABLET, DELAYED RELEASE ORAL at 18:47

## 2022-04-07 RX ADMIN — Medication 1 GRAM(S): at 18:47

## 2022-04-07 RX ADMIN — Medication 25 MILLIGRAM(S): at 18:47

## 2022-04-07 RX ADMIN — Medication 650 MILLIGRAM(S): at 19:19

## 2022-04-07 NOTE — DISCHARGE NOTE PROVIDER - NSDCMRMEDTOKEN_GEN_ALL_CORE_FT
amiodarone 200 mg oral tablet: 2 tabs (400mg)  orally once a day for 2 weeks followed by 1 tab (200mg) once a day.  Eliquis 5 mg oral tablet: 1 tab(s) orally 2 times a day  losartan-hydrochlorothiazide 50 mg-12.5 mg oral tablet: 1 tab(s) orally once a day  Metoprolol Succinate ER 25 mg oral tablet, extended release: 1 tab(s) orally once a day   Otezla 30 mg oral tablet: 1 tab(s) orally 2 times a day  simvastatin 20 mg oral tablet: 1 tab(s) orally once a day (at bedtime)   amiodarone 200 mg oral tablet: 0.5 tab(s) orally once a day  colchicine 0.6 mg oral tablet: 1 tab(s) orally once a day x 30 days   Eliquis 5 mg oral tablet: 1 tab(s) orally 2 times a day  losartan-hydrochlorothiazide 50 mg-12.5 mg oral tablet: 1 tab(s) orally once a day  Metoprolol Succinate ER 25 mg oral tablet, extended release: 1 tab(s) orally once a day   Otezla 30 mg oral tablet: 1 tab(s) orally 2 times a day  pantoprazole 40 mg oral delayed release tablet: 1 tab(s) orally 2 times a day x 14 days followed by 1 tab orally daily x 30 days.   simvastatin 20 mg oral tablet: 1 tab(s) orally once a day (at bedtime)  sucralfate 1 g/10 mL oral suspension: 10 milliliter(s) orally 2 times a day x 14 days

## 2022-04-07 NOTE — PROGRESS NOTE ADULT - SUBJECTIVE AND OBJECTIVE BOX
Presents for elective AF ablation. Denies changes in health status since PST 3/29/22. Patient specifically denies chest pain, shortness of breath at rest or with exertion, near/true syncope, fevers/chills, N/V/D, or other cardiac or constitutional symptoms.    Last dose Eliquis 4/6PM.   NPO since last PM.   Groin prep per protocol.

## 2022-04-07 NOTE — PROGRESS NOTE ADULT - SUBJECTIVE AND OBJECTIVE BOX
PROCEDURE(S): Radiofrequency Ablation of Atrial Fibrillation & Atrial Flutter    ELECTRPHYSIOLOGIST(S): Marielos Perkins MD         COMPLICATIONS:  none        DISPOSITION:  observation     CONDITION: stable      Pt doing well s/p atrial fibrillation ablation ([facility]I, CTI) via b/l [facility] access. Denies complaint.       MEDICATIONS  (STANDING):  aMIOdarone    Tablet 100 milliGRAM(s) Oral daily  apixaban 5 milliGRAM(s) Oral <User Schedule>  colchicine 0.6 milliGRAM(s) Oral daily  hydrochlorothiazide 12.5 milliGRAM(s) Oral daily  losartan 50 milliGRAM(s) Oral daily  metoprolol succinate ER 25 milliGRAM(s) Oral daily  pantoprazole    Tablet 40 milliGRAM(s) Oral two times a day  simvastatin 20 milliGRAM(s) Oral at bedtime  sucralfate suspension 1 Gram(s) Oral two times a day    MEDICATIONS  (PRN):  acetaminophen     Tablet .. 650 milliGRAM(s) Oral every 6 hours PRN Mild Pain (1 - 3)  ALPRAZolam 0.25 milliGRAM(s) Oral every 6 hours PRN anxiety and/or insomnia  aluminum hydroxide/magnesium hydroxide/simethicone Suspension 30 milliLiter(s) Oral every 4 hours PRN Dyspepsia  benzocaine 15 mG/menthol 3.6 mG Lozenge 1 Lozenge Oral every 2 hours PRN Sore Throat      Allergies  No Known Allergies      Exam:   T(C): 36.8 (04-07-22 @ 11:06), Max: 36.8 (04-07-22 @ 11:06)  HR: 95 (04-07-22 @ 17:55) (63 - 103)  BP: 120/59 (04-07-22 @ 17:55) (120/58 - 162/70)  RR: 16 (04-07-22 @ 17:55) (15 - 16)  SpO2: 94% (04-07-22 @ 17:55) (94% - 99%)    VSS, NAD, A&O x 3  Card: S1/S2, RRR, no m/g/r  Resp: lungs CTA b/l  Abd: S/NT/ND  Groins: hemostatic sutures in place; sites C/D/I; no bleeding, hematoma, erythema, exudate or edema  Ext: no edema; distal pulses intact    I/Os: net + 1875mL    ECG: sinus rhythm w/ incomplete RBBB (QRS ~105ms), otherwise normal intervals; normal axis     Assessment:   66y Male h/o  HLD, HTN, epigastric hernia, psoriasis, persistent atrial fibrillation s/p ASMITA, cardioversion and amiodarone load.  Now status post uncomplicated radiofrequency ablation of atrial fibrillation ([facility]/PVI, CTI) via b/l [facility] access.      Plan:   Bedrest x 4 hours, then OOB with assistance and progress as tolerated.   Groin sutures to be removed by EP service in AM.   Pending groin status: Eliquis 5mg PO q 12 hours to resume at 2030 tonight.   Decrease amiodarone [id number] daily.   Continue other home medications.   Start Protonix 40mg twice daily x 2 weeks, then once daily x 6 weeks.   Start Carafate 1gm BID x 2 weeks.   Start colchicine 0.6mg daily x 1 month as tolerated.   Strict I/Os.  Please encourage incentive spirometry and ambulation once able.  Observation and monitoring on telemetry overnight with anticipated discharge in the AM and outpt follow up in 2-4 weeks.

## 2022-04-07 NOTE — DISCHARGE NOTE PROVIDER - NSDCCPTREATMENT_GEN_ALL_CORE_FT
PRINCIPAL PROCEDURE  Procedure: Electrophysiology study with ablation of focus atrial fibrillation  Findings and Treatment: - Bruising at the groin, sometimes extending down the leg, and/or a small lump under the skin at the groin access site is normal and will resolve within 2 – 3 weeks.   - Occasional skipped beats or palpitations that last for a few beats are common and generally resolve within 1-2 months.   - You make walk and take stairs at a regular pace.   - Do not perform any exercise more strenuous than walking for 1 week.   - Do not strain or lift heavy objects for 1 week.  - You may shower the day after the procedure.  - Do not soak in water (such as tub baths, hot tubs, swimming, etc.) for 1 week.   - You may resume all other activities the day after the procedure.  Call your doctor if:   - you notice bleeding, redness, drainage, swelling, increased tenderness or a hot sensation around the catheter insertion site.   - your temperature is greater than 100 degrees F for more than 24 hours.  - your rapid heart rhythm returns.  - you have any questions or concerns regarding the procedure.  If significant bleeding and/or a large lump (the size of a golf ball or bigger) occurs:  - Lie flat and apply continuous direct pressure just above the puncture site for at least 10 minutes  - If the issue resolves, notify your physician immediately.    - If the bleeding cannot be controlled, please seek immediate medical attention.  If you experience increased difficulty breathing or chest pain, or if you faint or have dizzy spells, please seek immediate medical attention.

## 2022-04-07 NOTE — DISCHARGE NOTE PROVIDER - NSDCFUADDINST_GEN_ALL_CORE_FT
Follow up with Dr. Perkins as scheduled 5/6/22.  Follow up with Dr. Perkins as scheduled 5/6/22.   Reduce your Amiodarone to 100mg daily

## 2022-04-07 NOTE — DISCHARGE NOTE PROVIDER - HOSPITAL COURSE
66y Male h/o  HLD, HTN, epigastric hernia, psoriasis, persistent atrial fibrillation s/p ASMITA, cardioversion and amiodarone load.  He presented electively 4/7/22 and underwent uncomplicated radiofrequency ablation of atrial fibrillation ([facility]/PVI, CTI) via b/l FV access.

## 2022-04-08 ENCOUNTER — TRANSCRIPTION ENCOUNTER (OUTPATIENT)
Age: 67
End: 2022-04-08

## 2022-04-08 VITALS — TEMPERATURE: 98 F

## 2022-04-08 LAB
ALBUMIN SERPL ELPH-MCNC: 4.1 G/DL — SIGNIFICANT CHANGE UP (ref 3.3–5.2)
ALP SERPL-CCNC: 57 U/L — SIGNIFICANT CHANGE UP (ref 40–120)
ALT FLD-CCNC: 24 U/L — SIGNIFICANT CHANGE UP
ANION GAP SERPL CALC-SCNC: 11 MMOL/L — SIGNIFICANT CHANGE UP (ref 5–17)
AST SERPL-CCNC: 31 U/L — SIGNIFICANT CHANGE UP
BILIRUB SERPL-MCNC: 0.4 MG/DL — SIGNIFICANT CHANGE UP (ref 0.4–2)
BUN SERPL-MCNC: 14.6 MG/DL — SIGNIFICANT CHANGE UP (ref 8–20)
CALCIUM SERPL-MCNC: 8.9 MG/DL — SIGNIFICANT CHANGE UP (ref 8.6–10.2)
CHLORIDE SERPL-SCNC: 104 MMOL/L — SIGNIFICANT CHANGE UP (ref 98–107)
CO2 SERPL-SCNC: 26 MMOL/L — SIGNIFICANT CHANGE UP (ref 22–29)
CREAT SERPL-MCNC: 0.64 MG/DL — SIGNIFICANT CHANGE UP (ref 0.5–1.3)
EGFR: 103 ML/MIN/1.73M2 — SIGNIFICANT CHANGE UP
GLUCOSE SERPL-MCNC: 118 MG/DL — HIGH (ref 70–99)
HCT VFR BLD CALC: 41.2 % — SIGNIFICANT CHANGE UP (ref 39–50)
HGB BLD-MCNC: 14 G/DL — SIGNIFICANT CHANGE UP (ref 13–17)
MAGNESIUM SERPL-MCNC: 2.2 MG/DL — SIGNIFICANT CHANGE UP (ref 1.6–2.6)
MCHC RBC-ENTMCNC: 31.7 PG — SIGNIFICANT CHANGE UP (ref 27–34)
MCHC RBC-ENTMCNC: 34 GM/DL — SIGNIFICANT CHANGE UP (ref 32–36)
MCV RBC AUTO: 93.2 FL — SIGNIFICANT CHANGE UP (ref 80–100)
PLATELET # BLD AUTO: 142 K/UL — LOW (ref 150–400)
POTASSIUM SERPL-MCNC: 4.2 MMOL/L — SIGNIFICANT CHANGE UP (ref 3.5–5.3)
POTASSIUM SERPL-SCNC: 4.2 MMOL/L — SIGNIFICANT CHANGE UP (ref 3.5–5.3)
PROT SERPL-MCNC: 6.7 G/DL — SIGNIFICANT CHANGE UP (ref 6.6–8.7)
RBC # BLD: 4.42 M/UL — SIGNIFICANT CHANGE UP (ref 4.2–5.8)
RBC # FLD: 12.1 % — SIGNIFICANT CHANGE UP (ref 10.3–14.5)
SODIUM SERPL-SCNC: 141 MMOL/L — SIGNIFICANT CHANGE UP (ref 135–145)
TSH SERPL-MCNC: 0.28 UIU/ML — SIGNIFICANT CHANGE UP (ref 0.27–4.2)
WBC # BLD: 8.49 K/UL — SIGNIFICANT CHANGE UP (ref 3.8–10.5)
WBC # FLD AUTO: 8.49 K/UL — SIGNIFICANT CHANGE UP (ref 3.8–10.5)

## 2022-04-08 PROCEDURE — 83735 ASSAY OF MAGNESIUM: CPT

## 2022-04-08 PROCEDURE — C1732: CPT

## 2022-04-08 PROCEDURE — 93623 PRGRMD STIMJ&PACG IV RX NFS: CPT

## 2022-04-08 PROCEDURE — C1889: CPT

## 2022-04-08 PROCEDURE — 99236 HOSP IP/OBS SAME DATE HI 85: CPT

## 2022-04-08 PROCEDURE — 93655 ICAR CATH ABLTJ DSCRT ARRHYT: CPT

## 2022-04-08 PROCEDURE — 84443 ASSAY THYROID STIM HORMONE: CPT

## 2022-04-08 PROCEDURE — C1731: CPT

## 2022-04-08 PROCEDURE — 36415 COLL VENOUS BLD VENIPUNCTURE: CPT

## 2022-04-08 PROCEDURE — C1894: CPT

## 2022-04-08 PROCEDURE — 85027 COMPLETE CBC AUTOMATED: CPT

## 2022-04-08 PROCEDURE — C1759: CPT

## 2022-04-08 PROCEDURE — C1769: CPT

## 2022-04-08 PROCEDURE — 80053 COMPREHEN METABOLIC PANEL: CPT

## 2022-04-08 PROCEDURE — 93656 COMPRE EP EVAL ABLTJ ATR FIB: CPT

## 2022-04-08 PROCEDURE — 93005 ELECTROCARDIOGRAM TRACING: CPT

## 2022-04-08 PROCEDURE — C1766: CPT

## 2022-04-08 PROCEDURE — 93010 ELECTROCARDIOGRAM REPORT: CPT

## 2022-04-08 RX ORDER — AMIODARONE HYDROCHLORIDE 400 MG/1
0.5 TABLET ORAL
Qty: 0 | Refills: 0 | DISCHARGE
Start: 2022-04-08

## 2022-04-08 RX ORDER — PANTOPRAZOLE SODIUM 20 MG/1
1 TABLET, DELAYED RELEASE ORAL
Qty: 58 | Refills: 0
Start: 2022-04-08 | End: 2022-04-21

## 2022-04-08 RX ORDER — SUCRALFATE 1 G
10 TABLET ORAL
Qty: 280 | Refills: 0
Start: 2022-04-08 | End: 2022-04-21

## 2022-04-08 RX ORDER — COLCHICINE 0.6 MG
1 TABLET ORAL
Qty: 30 | Refills: 0
Start: 2022-04-08 | End: 2022-05-07

## 2022-04-08 RX ADMIN — Medication 1 GRAM(S): at 05:38

## 2022-04-08 RX ADMIN — APIXABAN 5 MILLIGRAM(S): 2.5 TABLET, FILM COATED ORAL at 08:27

## 2022-04-08 RX ADMIN — LOSARTAN POTASSIUM 50 MILLIGRAM(S): 100 TABLET, FILM COATED ORAL at 05:38

## 2022-04-08 RX ADMIN — Medication 12.5 MILLIGRAM(S): at 05:38

## 2022-04-08 RX ADMIN — PANTOPRAZOLE SODIUM 40 MILLIGRAM(S): 20 TABLET, DELAYED RELEASE ORAL at 05:37

## 2022-04-08 RX ADMIN — Medication 25 MILLIGRAM(S): at 05:38

## 2022-04-08 RX ADMIN — AMIODARONE HYDROCHLORIDE 100 MILLIGRAM(S): 400 TABLET ORAL at 05:38

## 2022-04-08 NOTE — PROGRESS NOTE ADULT - SUBJECTIVE AND OBJECTIVE BOX
Patient seen today in bed. Figure 8 sutures removed from right and left groin without complication. No overnight complaints.     EKG: pending  TELE: SR. No events.     MEDICATIONS  (STANDING):  aMIOdarone    Tablet 100 milliGRAM(s) Oral daily  apixaban 5 milliGRAM(s) Oral <User Schedule>  colchicine 0.6 milliGRAM(s) Oral daily  hydrochlorothiazide 12.5 milliGRAM(s) Oral daily  losartan 50 milliGRAM(s) Oral daily  metoprolol succinate ER 25 milliGRAM(s) Oral daily  pantoprazole    Tablet 40 milliGRAM(s) Oral two times a day  simvastatin 20 milliGRAM(s) Oral at bedtime  sucralfate suspension 1 Gram(s) Oral two times a day    MEDICATIONS  (PRN):  acetaminophen     Tablet .. 650 milliGRAM(s) Oral every 6 hours PRN Mild Pain (1 - 3)  ALPRAZolam 0.25 milliGRAM(s) Oral every 6 hours PRN anxiety and/or insomnia  aluminum hydroxide/magnesium hydroxide/simethicone Suspension 30 milliLiter(s) Oral every 4 hours PRN Dyspepsia  benzocaine 15 mG/menthol 3.6 mG Lozenge 1 Lozenge Oral every 2 hours PRN Sore Throat    Allergies  No Known Allergies    PAST MEDICAL & SURGICAL HISTORY:  Epigastric hernia  HTN (hypertension)  Hypercholesterolemia  Psoriasis  Afib  Psoriasis  At risk for sleep apnea  Bang score 3  H/O inguinal hernia  left 1994, 2004 right,2019  H/O colonoscopy  2013    Vital Signs Last 24 Hrs  T(C): 36.5 (08 Apr 2022 05:34), Max: 36.8 (07 Apr 2022 11:06)  T(F): 97.7 (08 Apr 2022 05:34), Max: 98.2 (07 Apr 2022 11:06)  HR: 88 (08 Apr 2022 05:34) (63 - 103)  BP: 121/68 (08 Apr 2022 05:34) (113/59 - 162/70)  RR: 16 (08 Apr 2022 05:34) (15 - 18)  SpO2: 95% (08 Apr 2022 05:34) (94% - 99%)    Physical Exam:  Constitutional: NAD, AAOx3  Cardiovascular: +S1S2 RRR  Pulmonary: CTA b/l, unlabored  GI: soft NTND +BS  Extremities: no pedal edema,  Right and left groin: No hematoma or ecchymosis  Neuro: non focal, [place] x4    LABS:                        14.0   8.49  )-----------( 142      ( 08 Apr 2022 05:57 )             41.2     141  |  104  |  14.6  ----------------------------<  118<H>  4.2   |  26.0  |  0.64  Ca    8.9      08 Apr 2022 05:57  Mg     2.2     04-08  TPro  6.7  /  Alb  4.1  /  TBili  0.4  /  DBili  x   /  AST  31  /  ALT  24  /  AlkPhos  57  04-08    A/P  66 year old male patient with a history of HLD, HTN, epigastric hernia, psoriasis, persistent atrial fibrillation s/p ASMITA, cardioversion and amiodarone load.  Now status post uncomplicated radiofrequency ablation of atrial fibrillation (WACA/PVI, CTI) via b/l FV access.    - Discharge home today.    Patient seen today in bed. Figure 8 sutures removed from right and left groin without complication. No overnight complaints.     EKG: pending  TELE: SR. No events.     MEDICATIONS  (STANDING):  aMIOdarone    Tablet 100 milliGRAM(s) Oral daily  apixaban 5 milliGRAM(s) Oral <User Schedule>  colchicine 0.6 milliGRAM(s) Oral daily  hydrochlorothiazide 12.5 milliGRAM(s) Oral daily  losartan 50 milliGRAM(s) Oral daily  metoprolol succinate ER 25 milliGRAM(s) Oral daily  pantoprazole    Tablet 40 milliGRAM(s) Oral two times a day  simvastatin 20 milliGRAM(s) Oral at bedtime  sucralfate suspension 1 Gram(s) Oral two times a day    MEDICATIONS  (PRN):  acetaminophen     Tablet .. 650 milliGRAM(s) Oral every 6 hours PRN Mild Pain (1 - 3)  ALPRAZolam 0.25 milliGRAM(s) Oral every 6 hours PRN anxiety and/or insomnia  aluminum hydroxide/magnesium hydroxide/simethicone Suspension 30 milliLiter(s) Oral every 4 hours PRN Dyspepsia  benzocaine 15 mG/menthol 3.6 mG Lozenge 1 Lozenge Oral every 2 hours PRN Sore Throat    Allergies  No Known Allergies    PAST MEDICAL & SURGICAL HISTORY:  Epigastric hernia  HTN (hypertension)  Hypercholesterolemia  Psoriasis  Afib  Psoriasis  At risk for sleep apnea  Bang score 3  H/O inguinal hernia  left 1994, 2004 right,2019  H/O colonoscopy  2013    Vital Signs Last 24 Hrs  T(C): 36.5 (08 Apr 2022 05:34), Max: 36.8 (07 Apr 2022 11:06)  T(F): 97.7 (08 Apr 2022 05:34), Max: 98.2 (07 Apr 2022 11:06)  HR: 88 (08 Apr 2022 05:34) (63 - 103)  BP: 121/68 (08 Apr 2022 05:34) (113/59 - 162/70)  RR: 16 (08 Apr 2022 05:34) (15 - 18)  SpO2: 95% (08 Apr 2022 05:34) (94% - 99%)    Physical Exam:  Constitutional: NAD, AAOx3  Cardiovascular: +S1S2 RRR  Pulmonary: CTA b/l, unlabored  GI: soft NTND +BS  Extremities: no pedal edema,  Right and left groin: No hematoma or ecchymosis  Neuro: non focal, [place] x4    LABS:                        14.0   8.49  )-----------( 142      ( 08 Apr 2022 05:57 )             41.2     141  |  104  |  14.6  ----------------------------<  118<H>  4.2   |  26.0  |  0.64  Ca    8.9      08 Apr 2022 05:57  Mg     2.2     04-08  TPro  6.7  /  Alb  4.1  /  TBili  0.4  /  DBili  x   /  AST  31  /  ALT  24  /  AlkPhos  57  04-08

## 2022-04-08 NOTE — PROGRESS NOTE ADULT - NS ATTEND AMEND GEN_ALL_CORE FT
afib s/p ablation PVI isolation via [facility]ACA and typical atrial flutter ablation uncomplicated procedure did well. continue current medications.

## 2022-04-08 NOTE — PROGRESS NOTE ADULT - ASSESSMENT
A/P  66 year old male patient with a history of HLD, HTN, epigastric hernia, psoriasis, persistent atrial fibrillation s/p ASMITA, cardioversion and amiodarone load.  Now status post uncomplicated radiofrequency ablation of atrial fibrillation (WACA/PVI, CTI) via b/l FV access.    - Discharge home today.

## 2022-04-08 NOTE — DISCHARGE NOTE NURSING/CASE MANAGEMENT/SOCIAL WORK - PATIENT PORTAL LINK FT
You can access the FollowMyHealth Patient Portal offered by [facility] by registering at the following website: [email or web address]. By joining [facility]’s FollowMyHealth portal, you will also be able to view your health information using other applications (apps) compatible with our system.

## 2022-04-08 NOTE — DISCHARGE NOTE NURSING/CASE MANAGEMENT/SOCIAL WORK - NSDCVIVACCINE_GEN_ALL_CORE_FT
Shot for [employer] Control: Care Instructions  Your Care Instructions  The shot is used to prevent pregnancy. You get the shot in your upper arm or rear end (buttocks). The shot gives you a dose of the hormone progestin. The shot is often called by its brand name, Depo-Provera. The shot provides birth control for 3 months at a time. You then need another shot. Follow-up care is a key part of your treatment and safety. Be sure to make and go to all appointments, and call your doctor if you are having problems. It's also a good idea to know your test results and keep a list of the medicines you take. How can you care for yourself at home? How do you use the birth control shot? · If you get the shot during the first 5 days of your normal period, use backup birth control, such as a condom, or don't have intercourse for 24 hours. · If you get the shot more than 5 days after your periods starts, use backup birth control or don't have intercourse for 5 days. · Talk to your doctor about a schedule to get the shot. You need the shot every 3 months. If you are late getting it, you'll need backup birth control. What if you miss or are late for a shot? Always read the label for specific instructions, or call your doctor. Here are some basic guidelines:  · Use backup birth control, such as a condom, or don't have intercourse. Continue using one of these methods until 5 days after you get the missed or late shot. · If you had intercourse, you can use emergency contraception, such as the morning-after pill (Plan B). You can use emergency contraception for up to 5 days after having had sex, but it works best if you take it right away. What else do you need to know? · The shot has side effects. Because the shot protects for 3 months, the side effects may last 3 months. ¨ You may have changes in your period and your period may stop. You may also have spotting or bleeding between periods.   ¨ You may have mood changes, less interest in sex, or weight gain. · The shot may cause bone loss. Talk to your doctor about this and take steps to prevent bone loss, such as getting enough calcium in your diet and exercising regularly. · Check with your doctor before you use any other medicines, including over-the-counter medicines, vitamins, herbal products, and supplements. Birth control hormones may not work as well to prevent pregnancy when combined with other medicines. · The shot doesn't protect against sexually transmitted infections (STIs), such as herpes or HIV/AIDS. If you're not sure whether your sex partner might have an STI, use a condom to protect against infection. When should you call for help? Watch closely for changes in your health, and be sure to contact your doctor if you have any problems. Where can you learn more? Go to http://[facility].info/. Enter C791 in the search box to learn more about \"Shot for Birth Control: Care Instructions. \"  Current as of: March 16, 2017  Content Version: 11.4  © 7804-9815 [employer], Incorporated. Care instructions adapted under license by [employer] (which disclaims liability or warranty for this information). If you have questions about a medical condition or this instruction, always ask your healthcare professional. [address] any warranty or liability for your use of this information. No Vaccines Administered.

## 2022-05-04 ENCOUNTER — RX RENEWAL (OUTPATIENT)
Age: 67
End: 2022-05-04

## 2022-05-06 ENCOUNTER — APPOINTMENT (OUTPATIENT)
Dept: ELECTROPHYSIOLOGY | Facility: CLINIC | Age: 67
End: 2022-05-06
Payer: COMMERCIAL

## 2022-05-06 ENCOUNTER — NON-APPOINTMENT (OUTPATIENT)
Age: 67
End: 2022-05-06

## 2022-05-06 VITALS
DIASTOLIC BLOOD PRESSURE: 78 MMHG | WEIGHT: 220 LBS | SYSTOLIC BLOOD PRESSURE: 139 MMHG | HEIGHT: 72 IN | OXYGEN SATURATION: 96 % | BODY MASS INDEX: 29.8 KG/M2 | HEART RATE: 80 BPM

## 2022-05-06 PROBLEM — I48.91 UNSPECIFIED ATRIAL FIBRILLATION: Chronic | Status: ACTIVE | Noted: 2022-03-29

## 2022-05-06 PROBLEM — L40.9 PSORIASIS, UNSPECIFIED: Chronic | Status: ACTIVE | Noted: 2022-03-29

## 2022-05-06 PROBLEM — Z91.89 OTHER SPECIFIED PERSONAL RISK FACTORS, NOT ELSEWHERE CLASSIFIED: Chronic | Status: ACTIVE | Noted: 2022-03-29

## 2022-05-06 PROCEDURE — 99214 OFFICE O/P EST MOD 30 MIN: CPT

## 2022-05-06 PROCEDURE — 93000 ELECTROCARDIOGRAM COMPLETE: CPT

## 2022-05-06 RX ORDER — METOPROLOL SUCCINATE 50 MG/1
50 TABLET, EXTENDED RELEASE ORAL DAILY
Qty: 90 | Refills: 1 | Status: COMPLETED | COMMUNITY
Start: 2022-02-10 | End: 2022-05-06

## 2022-05-06 NOTE — HISTORY OF PRESENT ILLNESS
[FreeTextEntry1] : 66-year-old male with history of hypertension recently diagnoses of persistent atrial fibrillation he had paroxysmal episodes of palpitations several months prior. Symptomatic with exertional dyspnea while in AF. S/p RFCA (PVI via [facility]) for afib treatment. Patient is on Eliquis 5 mg twice daily denies any bleeding. He underwent ASMITA guided DCCV and started on amiodarone on 1/14/2022. Denies any bleeding.  Also on metoprolol XL. He reduced drinking alcohol and had weight loss 10lb. \par He feels well remains in SR. \par TSH wnl\par ecg 5.6.22 SR 72 bpm, nml QTc\par ecg 2.10.22 Afib 100 bpm\par ecg 1.3.22 AF  bpm, narrow qrs

## 2022-05-06 NOTE — DISCUSSION/SUMMARY
[FreeTextEntry1] : 66-year-old male with history of hypertension recently diagnoses of persistent atrial fibrillation he had paroxysmal episodes of palpitations several months prior. Symptomatic with exertional dyspnea while in AF. S/p RFCA (PVI via [facility]) for afib treatment. Patient is on Eliquis 5 mg twice daily denies any bleeding. \par -s/p AF abaltion doing well remains in SR, continue amiodarone 100mg daily and will stop it in 3 months, cont apple watch monitoring\par -weight loss exercise, reduce alcohol intake \par -cont eliquis and metoprolol \par -f/up in 3 months \par \par Total length of time spent with this patient was 30 minutes and more then half of the time was spent face to face with the patient as well as counseling and coordination of care as stated above.\par

## 2022-08-12 ENCOUNTER — NON-APPOINTMENT (OUTPATIENT)
Age: 67
End: 2022-08-12

## 2022-08-12 ENCOUNTER — APPOINTMENT (OUTPATIENT)
Dept: ELECTROPHYSIOLOGY | Facility: CLINIC | Age: 67
End: 2022-08-12

## 2022-08-12 VITALS
WEIGHT: 223 LBS | HEIGHT: 75 IN | HEART RATE: 52 BPM | SYSTOLIC BLOOD PRESSURE: 124 MMHG | BODY MASS INDEX: 27.73 KG/M2 | OXYGEN SATURATION: 98 % | DIASTOLIC BLOOD PRESSURE: 70 MMHG | RESPIRATION RATE: 16 BRPM

## 2022-08-12 PROCEDURE — 99214 OFFICE O/P EST MOD 30 MIN: CPT

## 2022-08-12 PROCEDURE — 93000 ELECTROCARDIOGRAM COMPLETE: CPT

## 2022-08-12 RX ORDER — AMIODARONE HYDROCHLORIDE 200 MG/1
200 TABLET ORAL
Refills: 0 | Status: DISCONTINUED | COMMUNITY

## 2022-08-12 RX ORDER — AMIODARONE HYDROCHLORIDE 200 MG/1
200 TABLET ORAL DAILY
Qty: 30 | Refills: 3 | Status: DISCONTINUED | COMMUNITY
Start: 2022-01-31 | End: 2022-08-12

## 2022-08-12 NOTE — DISCUSSION/SUMMARY
[FreeTextEntry1] : 67-year-old male with history of hypertension recently diagnoses of persistent atrial fibrillation he had paroxysmal episodes of palpitations several months prior. Symptomatic with exertional dyspnea while in AF. S/p RFCA (PVI via [facility]) for afib treatment. Patient is on Eliquis 5 mg twice daily denies any bleeding. Doing well remain in SR\par -s/p AF ablation doing well remains in SR, stop amiodarone now, \par -cont apple watch monitoring\par -weight loss exercise, reduce alcohol intake \par -cont eliquis and metoprolol \par -f/up in 6 months \par \par Total length of time spent with this patient was 30 minutes and more then half of the time was spent face to face with the patient as well as counseling and coordination of care as stated above.\par

## 2022-08-12 NOTE — HISTORY OF PRESENT ILLNESS
[FreeTextEntry1] : 67-year-old male with history of hypertension recently diagnoses of persistent atrial fibrillation he had paroxysmal episodes of palpitations several months prior. Symptomatic with exertional dyspnea while in AF. S/p RFCA (PVI via [facility]ACA) for afib treatment. Patient is on Eliquis 5 mg twice daily denies any bleeding. He underwent ASMITA guided DCCV and started on amiodarone on 1/14/2022, post ablation amio reduced to 100mg. Denies any bleeding.  Also on metoprolol XL 25mg daily. He reduced drinking alcohol and had weight loss 10lb. \par He feels well remains in SR. \par TSH wnl\par ecg 8.12.22 SB 54 bpm\par ecg 5.6.22 SR 72 bpm, nml QTc\par ecg 2.10.22 Afib 100 bpm\par ecg 1.3.22 AF  bpm, narrow qrs

## 2022-12-06 ENCOUNTER — OUTPATIENT (OUTPATIENT)
Dept: OUTPATIENT SERVICES | Facility: HOSPITAL | Age: 67
LOS: 1 days | End: 2022-12-06

## 2022-12-06 ENCOUNTER — APPOINTMENT (OUTPATIENT)
Dept: INTERNAL MEDICINE | Facility: CLINIC | Age: 67
End: 2022-12-06

## 2022-12-06 ENCOUNTER — NON-APPOINTMENT (OUTPATIENT)
Age: 67
End: 2022-12-06

## 2022-12-06 DIAGNOSIS — Z98.890 OTHER SPECIFIED POSTPROCEDURAL STATES: Chronic | ICD-10-CM

## 2022-12-06 DIAGNOSIS — Z87.19 PERSONAL HISTORY OF OTHER DISEASES OF THE DIGESTIVE SYSTEM: Chronic | ICD-10-CM

## 2022-12-13 ENCOUNTER — OFFICE (OUTPATIENT)
Dept: URBAN - METROPOLITAN AREA CLINIC 102 | Facility: CLINIC | Age: 67
Setting detail: OPHTHALMOLOGY
End: 2022-12-13
Payer: COMMERCIAL

## 2022-12-13 DIAGNOSIS — H25.13: ICD-10-CM

## 2022-12-13 DIAGNOSIS — H16.223: ICD-10-CM

## 2022-12-13 DIAGNOSIS — B00.52: ICD-10-CM

## 2022-12-13 DIAGNOSIS — H43.393: ICD-10-CM

## 2022-12-13 PROCEDURE — 92014 COMPRE OPH EXAM EST PT 1/>: CPT | Performed by: OPHTHALMOLOGY

## 2022-12-13 ASSESSMENT — AXIALLENGTH_DERIVED
OD_AL: 24.7264
OS_AL: 24.5504

## 2022-12-13 ASSESSMENT — REFRACTION_AUTOREFRACTION
OS_AXIS: 172
OS_CYLINDER: -0.75
OS_SPHERE: +0.25
OD_CYLINDER: -2.25
OD_SPHERE: +2.00
OD_AXIS: 177

## 2022-12-13 ASSESSMENT — VISUAL ACUITY
OS_BCVA: 20/25-
OD_BCVA: 20/25

## 2022-12-13 ASSESSMENT — KERATOMETRY
OD_K1POWER_DIOPTERS: 38.25
OS_K1POWER_DIOPTERS: 40.75
OS_AXISANGLE_DEGREES: 078
OS_K2POWER_DIOPTERS: 41.50
OD_AXISANGLE_DEGREES: 084
OD_K2POWER_DIOPTERS: 41.00

## 2022-12-13 ASSESSMENT — CONFRONTATIONAL VISUAL FIELD TEST (CVF)
OS_FINDINGS: FULL
OD_FINDINGS: FULL

## 2022-12-13 ASSESSMENT — SPHEQUIV_DERIVED
OD_SPHEQUIV: 0.875
OS_SPHEQUIV: -0.125

## 2022-12-13 ASSESSMENT — TONOMETRY
OD_IOP_MMHG: 15
OS_IOP_MMHG: 16

## 2023-02-16 ENCOUNTER — APPOINTMENT (OUTPATIENT)
Dept: ELECTROPHYSIOLOGY | Facility: CLINIC | Age: 68
End: 2023-02-16
Payer: COMMERCIAL

## 2023-02-16 ENCOUNTER — NON-APPOINTMENT (OUTPATIENT)
Age: 68
End: 2023-02-16

## 2023-02-16 VITALS
WEIGHT: 224 LBS | HEIGHT: 75 IN | HEART RATE: 93 BPM | OXYGEN SATURATION: 96 % | SYSTOLIC BLOOD PRESSURE: 142 MMHG | BODY MASS INDEX: 27.85 KG/M2 | DIASTOLIC BLOOD PRESSURE: 84 MMHG

## 2023-02-16 PROCEDURE — 99214 OFFICE O/P EST MOD 30 MIN: CPT

## 2023-02-16 PROCEDURE — 93000 ELECTROCARDIOGRAM COMPLETE: CPT

## 2023-02-16 NOTE — DISCUSSION/SUMMARY
[FreeTextEntry1] : 67-year-old male with history of hypertension recently diagnoses of persistent atrial fibrillation he had paroxysmal episodes of palpitations several months prior. Symptomatic with exertional dyspnea while in AF. S/p RFCA (PVI via [facility]) for afib treatment. Patient is on Eliquis 5 mg twice daily denies any bleeding. \par -s/p AF ablation doing well remains in SR \par -cont apple watch monitoring\par -weight loss exercise, reduce alcohol intake \par -cont eliquis 5mg bid and metoprolol 25xl daily\par -f/up in 12 months\par \par Total length of time spent with this patient was 30 minutes and more then half of the time was spent face to face with the patient as well as counseling and coordination of care as stated above.\par

## 2023-02-16 NOTE — HISTORY OF PRESENT ILLNESS
[FreeTextEntry1] : 67-year-old male with history of hypertension recently diagnoses of persistent atrial fibrillation he had paroxysmal episodes of palpitations several months prior. Symptomatic with exertional dyspnea while in AF. S/p RFCA (PVI via WACA) for afib treatment. Patient is on Eliquis 5 mg twice daily denies any bleeding. He underwent ASMITA guided DCCV and started on amiodarone on 1/14/2022, post ablation amio reduced to 100mg. Denies any bleeding.  Also on metoprolol XL 25mg daily. He reduced drinking alcohol and had weight loss 10lb. \par He feels well remains in SR, no further palpitations. \par TSH wnl\par ecg 2.16.23 SR 87, narrow qrs\par ecg 8.12.22 SB 54 bpm\par ecg 5.6.22 SR 72 bpm, nml QTc\par ecg 2.10.22 Afib 100 bpm\par ecg 1.3.22 AF  bpm, narrow qrs

## 2023-03-14 ENCOUNTER — NON-APPOINTMENT (OUTPATIENT)
Age: 68
End: 2023-03-14

## 2023-04-20 ENCOUNTER — NON-APPOINTMENT (OUTPATIENT)
Age: 68
End: 2023-04-20

## 2023-04-25 ENCOUNTER — OFFICE (OUTPATIENT)
Dept: URBAN - METROPOLITAN AREA CLINIC 102 | Facility: CLINIC | Age: 68
Setting detail: OPHTHALMOLOGY
End: 2023-04-25
Payer: COMMERCIAL

## 2023-04-25 DIAGNOSIS — H25.13: ICD-10-CM

## 2023-04-25 DIAGNOSIS — H16.223: ICD-10-CM

## 2023-04-25 DIAGNOSIS — H43.813: ICD-10-CM

## 2023-04-25 DIAGNOSIS — B00.52: ICD-10-CM

## 2023-04-25 PROCEDURE — 92014 COMPRE OPH EXAM EST PT 1/>: CPT | Performed by: OPHTHALMOLOGY

## 2023-04-25 PROCEDURE — 92201 OPSCPY EXTND RTA DRAW UNI/BI: CPT | Performed by: OPHTHALMOLOGY

## 2023-04-25 ASSESSMENT — KERATOMETRY
OS_K2POWER_DIOPTERS: 41.75
METHOD_AUTO_MANUAL: AUTO
OS_K1POWER_DIOPTERS: 41.25
OD_K1POWER_DIOPTERS: 38.75
OS_AXISANGLE_DEGREES: 078
OD_AXISANGLE_DEGREES: 083
OD_K2POWER_DIOPTERS: 41.50

## 2023-04-25 ASSESSMENT — REFRACTION_AUTOREFRACTION
OS_SPHERE: 0.00
OD_AXIS: 176
OD_SPHERE: +1.50
OS_AXIS: 168
OS_CYLINDER: -0.51
OD_CYLINDER: -2.50

## 2023-04-25 ASSESSMENT — TONOMETRY
OS_IOP_MMHG: 16
OD_IOP_MMHG: 16

## 2023-04-25 ASSESSMENT — VISUAL ACUITY
OS_BCVA: 20/20
OD_BCVA: 20/30

## 2023-04-25 ASSESSMENT — SPHEQUIV_DERIVED
OD_SPHEQUIV: 0.25
OS_SPHEQUIV: -0.255

## 2023-04-25 ASSESSMENT — AXIALLENGTH_DERIVED
OD_AL: 24.7923
OS_AL: 24.46

## 2023-04-25 ASSESSMENT — CONFRONTATIONAL VISUAL FIELD TEST (CVF)
OD_FINDINGS: FULL
OS_FINDINGS: FULL

## 2023-04-28 ENCOUNTER — NON-APPOINTMENT (OUTPATIENT)
Age: 68
End: 2023-04-28

## 2023-05-09 ENCOUNTER — OFFICE (OUTPATIENT)
Dept: URBAN - METROPOLITAN AREA CLINIC 102 | Facility: CLINIC | Age: 68
Setting detail: OPHTHALMOLOGY
End: 2023-05-09
Payer: COMMERCIAL

## 2023-05-09 DIAGNOSIS — B00.52: ICD-10-CM

## 2023-05-09 DIAGNOSIS — H16.223: ICD-10-CM

## 2023-05-09 DIAGNOSIS — H43.813: ICD-10-CM

## 2023-05-09 DIAGNOSIS — H25.13: ICD-10-CM

## 2023-05-09 PROCEDURE — 92012 INTRM OPH EXAM EST PATIENT: CPT | Performed by: OPHTHALMOLOGY

## 2023-05-09 ASSESSMENT — REFRACTION_AUTOREFRACTION
OS_SPHERE: +0.25
OS_AXIS: 176
OD_AXIS: 176
OD_SPHERE: +1.50
OS_CYLINDER: -1.00
OD_CYLINDER: -1.75

## 2023-05-09 ASSESSMENT — VISUAL ACUITY
OS_BCVA: 20/20
OD_BCVA: 20/30-2

## 2023-05-09 ASSESSMENT — KERATOMETRY
OD_K1POWER_DIOPTERS: 38.50
METHOD_AUTO_MANUAL: AUTO
OS_K2POWER_DIOPTERS: 42.00
OD_K2POWER_DIOPTERS: 41.50
OD_AXISANGLE_DEGREES: 081
OS_K1POWER_DIOPTERS: 41.25
OS_AXISANGLE_DEGREES: 082

## 2023-05-09 ASSESSMENT — SPHEQUIV_DERIVED
OD_SPHEQUIV: 0.625
OS_SPHEQUIV: -0.25

## 2023-05-09 ASSESSMENT — AXIALLENGTH_DERIVED
OS_AL: 24.4051
OD_AL: 24.6821

## 2023-05-09 ASSESSMENT — CONFRONTATIONAL VISUAL FIELD TEST (CVF)
OD_FINDINGS: FULL
OS_FINDINGS: FULL

## 2023-05-09 ASSESSMENT — TONOMETRY
OD_IOP_MMHG: 14
OS_IOP_MMHG: 13

## 2023-06-05 ENCOUNTER — TRANSCRIPTION ENCOUNTER (OUTPATIENT)
Age: 68
End: 2023-06-05

## 2023-06-05 ENCOUNTER — APPOINTMENT (OUTPATIENT)
Dept: NEUROLOGY | Facility: CLINIC | Age: 68
End: 2023-06-05
Payer: COMMERCIAL

## 2023-06-05 VITALS
HEIGHT: 75 IN | BODY MASS INDEX: 25.36 KG/M2 | WEIGHT: 204 LBS | DIASTOLIC BLOOD PRESSURE: 76 MMHG | SYSTOLIC BLOOD PRESSURE: 123 MMHG | HEART RATE: 62 BPM | TEMPERATURE: 98.2 F

## 2023-06-05 DIAGNOSIS — Z87.2 PERSONAL HISTORY OF DISEASES OF THE SKIN AND SUBCUTANEOUS TISSUE: ICD-10-CM

## 2023-06-05 DIAGNOSIS — Z78.9 OTHER SPECIFIED HEALTH STATUS: ICD-10-CM

## 2023-06-05 PROCEDURE — 99204 OFFICE O/P NEW MOD 45 MIN: CPT

## 2023-06-05 RX ORDER — GLUCOSAMINE SULFATE 500 MG
CAPSULE ORAL
Refills: 0 | Status: ACTIVE | COMMUNITY

## 2023-06-05 NOTE — HISTORY OF PRESENT ILLNESS
[FreeTextEntry1] : 67-year-old male with PMHx of HTN, HLD, A-fib and psoriasis, presents today for evaluation of numbness and tingling sensation in the feet, left > right side that has been getting progressively worse.\par \par Patient reports that since past couple of years he started noticing numbness in the bottom of the feet, over repeated of time he started having intermittent tingling sensation on top of foot, left more than right side, he reports that during daytime while walking when he has his shoe wear on his symptoms are mild, however if he ever walks without shoe wear on uneven surface or on the sand he starts experiencing severe discomfort.  Patient also reports that every now and then the pain awakens him from his sleep, he denies paresthesias in the upper extremities.  Patient had consulted Dr. Guevara the podiatrist, had NCV studies performed, was told he has neuropathy and to consult a neurologist. \par \par Patient has no complaints of visual blurring, headache, nausea, vomiting, dizziness or bladder bowel dysfunction, he has occasional low back strain but denies low back pain radiating to the legs.

## 2023-06-05 NOTE — DISCUSSION/SUMMARY
[FreeTextEntry1] : 67-year-old M with PMHx of HTN, HLD, A-fib and psoriasis, presents with c/o numbness and tingling sensation in the feet, left > right side that has been getting progressively worse, at times results in nocturnal awakenings.\par \par #Peripheral sensory neuropathy; etiology undetermined\par \par -We will send neuropathy panel labs\par -Patient has had NCV studies done with Dr. Guevara, will retrieve the results and review it, if needed may want to repeat the test.\par -Trial with gabapentin 100 Mg in a.m., 200 mg at bedtime, adverse effect discussed with the patient\par

## 2023-06-05 NOTE — REASON FOR VISIT
[Consultation] : a consultation visit [FreeTextEntry1] : For evaluation of neuropathy, patient has been seen by podiatrist, Dr. Guevara - EMG was consistent with neuropathy

## 2023-06-05 NOTE — PHYSICAL EXAM
[General Appearance - Alert] : alert [General Appearance - In No Acute Distress] : in no acute distress [Oriented To Time, Place, And Person] : oriented to person, place, and time [Impaired Insight] : insight and judgment were intact [Affect] : the affect was normal [Person] : oriented to person [Place] : oriented to place [Time] : oriented to time [Registration Intact] : recent registration memory intact [Concentration Intact] : normal concentrating ability [Naming Objects] : no difficulty naming common objects [Repeating Phrases] : no difficulty repeating a phrase [Fluency] : fluency intact [Comprehension] : comprehension intact [Past History] : adequate knowledge of personal past history [Cranial Nerves Optic (II)] : visual acuity intact bilaterally,  visual fields full to confrontation, pupils equal round and reactive to light [Cranial Nerves Oculomotor (III)] : extraocular motion intact [Cranial Nerves Trigeminal (V)] : facial sensation intact symmetrically [Cranial Nerves Facial (VII)] : face symmetrical [Cranial Nerves Vestibulocochlear (VIII)] : hearing was intact bilaterally [Cranial Nerves Glossopharyngeal (IX)] : tongue and palate midline [Cranial Nerves Accessory (XI - Cranial And Spinal)] : head turning and shoulder shrug symmetric [Cranial Nerves Hypoglossal (XII)] : there was no tongue deviation with protrusion [Motor Tone] : muscle tone was normal in all four extremities [Motor Strength] : muscle strength was normal in all four extremities [No Muscle Atrophy] : normal bulk in all four extremities [Sensation Tactile Decrease] : light touch was intact [Abnormal Walk] : normal gait [Balance] : balance was intact [Proprioception] : proprioception was intact [Vibration Decrease Leg / Foot Both Ankles] : decreased at both ankles [Vibration Decrease Leg / Foot Toes Both Feet] : decreased at the toes of both feet  [Past-pointing] : there was no past-pointing [Tremor] : no tremor present [Coordination - Dysmetria Impaired Finger-to-Nose Bilateral] : not present [2+] : Patella right 2+ [1+] : Patella left 1+ [0] : Ankle jerk left 0 [Plantar Reflex Right Only] : normal on the right [Plantar Reflex Left Only] : normal on the left [PERRL With Normal Accommodation] : pupils were equal in size, round, reactive to light, with normal accommodation [Extraocular Movements] : extraocular movements were intact [Full Visual Field] : full visual field [Outer Ear] : the ears and nose were normal in appearance [Hearing Threshold Finger Rub Not Converse] : hearing was normal [Neck Cervical Mass (___cm)] : no neck mass was observed [Auscultation Breath Sounds / Voice Sounds] : lungs were clear to auscultation bilaterally [Heart Sounds] : normal S1 and S2 [Involuntary Movements] : no involuntary movements were seen [] : no rash

## 2023-06-05 NOTE — REVIEW OF SYSTEMS
[Numbness] : numbness [Tingling] : tingling [Hypersensitivity] : hypersensitivity [Negative] : Heme/Lymph [As Noted in HPI] : as noted in HPI

## 2023-06-08 ENCOUNTER — LABORATORY RESULT (OUTPATIENT)
Age: 68
End: 2023-06-08

## 2023-06-13 ENCOUNTER — APPOINTMENT (OUTPATIENT)
Dept: ELECTROPHYSIOLOGY | Facility: CLINIC | Age: 68
End: 2023-06-13
Payer: COMMERCIAL

## 2023-06-13 ENCOUNTER — NON-APPOINTMENT (OUTPATIENT)
Age: 68
End: 2023-06-13

## 2023-06-13 VITALS
BODY MASS INDEX: 25.36 KG/M2 | HEART RATE: 71 BPM | WEIGHT: 204 LBS | HEIGHT: 75 IN | RESPIRATION RATE: 16 BRPM | DIASTOLIC BLOOD PRESSURE: 73 MMHG | SYSTOLIC BLOOD PRESSURE: 120 MMHG | OXYGEN SATURATION: 96 %

## 2023-06-13 PROCEDURE — 99215 OFFICE O/P EST HI 40 MIN: CPT

## 2023-06-13 PROCEDURE — 93000 ELECTROCARDIOGRAM COMPLETE: CPT

## 2023-06-13 RX ORDER — GABAPENTIN 100 MG/1
100 CAPSULE ORAL
Qty: 90 | Refills: 2 | Status: DISCONTINUED | COMMUNITY
Start: 2023-06-05 | End: 2023-06-13

## 2023-06-13 RX ORDER — DRONEDARONE 400 MG/1
400 TABLET, FILM COATED ORAL TWICE DAILY
Qty: 180 | Refills: 0 | Status: DISCONTINUED | COMMUNITY
Start: 2023-04-28 | End: 2023-06-13

## 2023-06-13 NOTE — DISCUSSION/SUMMARY
[FreeTextEntry1] : 67-year-old male with history of hypertension recently diagnoses of persistent atrial fibrillation he had paroxysmal episodes of palpitations several months prior. Symptomatic with exertional dyspnea while in AF. S/p RFCA (PVI via [facility]) for afib treatment. Patient is on Eliquis 5 mg twice daily denies any bleeding. He has hyperthyroidism, had some unexplained weight loss will follow with endocrinologist for treatment  \par -s/p AF ablation doing well remains in SR but has breakthrough pAF symptomatic, will plan for tikosyn load inpatient  \par -cont apple watch monitoring\par -reduce alcohol intake \par -cont eliquis 5mg bid and metoprolol 25xl bid, can take more metoprolol during palpitations \par \par Total length of time spent with this patient was 45 minutes and more then half of the time was spent face to face with the patient as well as counseling and coordination of care as stated above.\par

## 2023-06-13 NOTE — HISTORY OF PRESENT ILLNESS
[FreeTextEntry1] : 67-year-old male with history of hypertension recently diagnoses of persistent atrial fibrillation he had paroxysmal episodes of palpitations several months prior. Symptomatic with exertional dyspnea while in AF. S/p RFCA (PVI via [facility]) for afib treatment 4.2022. Patient is on Eliquis 5 mg twice daily denies any bleeding. He underwent ASMITA guided DCCV and started on amiodarone on 1/14/2022 for short term and was stopped. Denies any bleeding.  Also on metoprolol XL 25mg daily. He reduced drinking alcohol and had weight loss 10lb. \par He has recurrence of pAF palpitations, he is recently dx with hyperthiroidism, seeing endocrinologist today. TSH <0.01 (6.2.23). Denies cp, dizziness, sob. He did not tolerate Multaq due to nausea. He is taking toprolol xl 25mg bid. \par ecg 6.13.23 SR 72 bpm\par ecg 2.16.23 SR 87, narrow qrs\par ecg 8.12.22 SB 54 bpm\par ecg 5.6.22 SR 72 bpm, nml QTc\par ecg 2.10.22 Afib 100 bpm\par ecg 1.3.22 AF  bpm, narrow qrs\par \par Cardiac work-up:\par MCT from 4/2023 28 day monitoring shows predominant sinus rhythm AF burden 5%, AF RVR to 190 bpm longest episode 13 minutes, NSVT

## 2023-06-14 ENCOUNTER — OUTPATIENT (OUTPATIENT)
Dept: OUTPATIENT SERVICES | Facility: HOSPITAL | Age: 68
LOS: 1 days | End: 2023-06-14
Payer: COMMERCIAL

## 2023-06-14 ENCOUNTER — APPOINTMENT (OUTPATIENT)
Dept: ULTRASOUND IMAGING | Facility: CLINIC | Age: 68
End: 2023-06-14
Payer: COMMERCIAL

## 2023-06-14 DIAGNOSIS — Z00.8 ENCOUNTER FOR OTHER GENERAL EXAMINATION: ICD-10-CM

## 2023-06-14 DIAGNOSIS — Z98.890 OTHER SPECIFIED POSTPROCEDURAL STATES: Chronic | ICD-10-CM

## 2023-06-14 DIAGNOSIS — Z87.19 PERSONAL HISTORY OF OTHER DISEASES OF THE DIGESTIVE SYSTEM: Chronic | ICD-10-CM

## 2023-06-14 PROCEDURE — 76536 US EXAM OF HEAD AND NECK: CPT

## 2023-06-14 PROCEDURE — 76536 US EXAM OF HEAD AND NECK: CPT | Mod: 26

## 2023-06-16 ENCOUNTER — NON-APPOINTMENT (OUTPATIENT)
Age: 68
End: 2023-06-16

## 2023-06-18 LAB
ACE BLD-CCNC: 40 U/L
ALBUMIN MFR SERPL ELPH: 59.1 %
ALBUMIN SERPL-MCNC: 4 G/DL
ALBUMIN/GLOB SERPL: 1.5 RATIO
ALDOLASE SERPL-CCNC: 4.5 U/L
ALPHA1 GLOB MFR SERPL ELPH: 4.8 %
ALPHA1 GLOB SERPL ELPH-MCNC: 0.3 G/DL
ALPHA2 GLOB MFR SERPL ELPH: 9.8 %
ALPHA2 GLOB SERPL ELPH-MCNC: 0.7 G/DL
ANA SER IF-ACNC: NEGATIVE
B-GLOBULIN MFR SERPL ELPH: 11.2 %
B-GLOBULIN SERPL ELPH-MCNC: 0.8 G/DL
CRP SERPL-MCNC: <3 MG/L
DEPRECATED KAPPA LC FREE/LAMBDA SER: 1.27 RATIO
ENA SS-A AB SER IA-ACNC: 0.2 AL
ENA SS-B AB SER IA-ACNC: <0.2 AL
ERYTHROCYTE [SEDIMENTATION RATE] IN BLOOD BY WESTERGREN METHOD: 8 MM/HR
FOLATE SERPL-MCNC: 15.2 NG/ML
GAMMA GLOB FLD ELPH-MCNC: 1 G/DL
GAMMA GLOB MFR SERPL ELPH: 15.1 %
HU AB SER QL: NEGATIVE
IGA SER QL IEP: 277 MG/DL
IGG SER QL IEP: 983 MG/DL
IGM SER QL IEP: 127 MG/DL
INTERPRETATION SERPL IEP-IMP: NORMAL
KAPPA LC CSF-MCNC: 2.01 MG/DL
KAPPA LC SERPL-MCNC: 2.55 MG/DL
M PROTEIN SPEC IFE-MCNC: NORMAL
PROT SERPL-MCNC: 6.7 G/DL
PROT SERPL-MCNC: 6.7 G/DL
RHEUMATOID FACT SER QL: 13 IU/ML
VIT B12 SERPL-MCNC: 523 PG/ML
VIT B6 SERPL-MCNC: 3.6 UG/L

## 2023-06-21 ENCOUNTER — OUTPATIENT (OUTPATIENT)
Dept: OUTPATIENT SERVICES | Facility: HOSPITAL | Age: 68
LOS: 1 days | End: 2023-06-21
Payer: COMMERCIAL

## 2023-06-21 DIAGNOSIS — E05.90 THYROTOXICOSIS, UNSPECIFIED WITHOUT THYROTOXIC CRISIS OR STORM: ICD-10-CM

## 2023-06-21 DIAGNOSIS — Z98.890 OTHER SPECIFIED POSTPROCEDURAL STATES: Chronic | ICD-10-CM

## 2023-06-21 DIAGNOSIS — Z87.19 PERSONAL HISTORY OF OTHER DISEASES OF THE DIGESTIVE SYSTEM: Chronic | ICD-10-CM

## 2023-06-21 PROCEDURE — A9516: CPT

## 2023-06-22 ENCOUNTER — OUTPATIENT (OUTPATIENT)
Dept: OUTPATIENT SERVICES | Facility: HOSPITAL | Age: 68
LOS: 1 days | End: 2023-06-22
Payer: COMMERCIAL

## 2023-06-22 DIAGNOSIS — Z87.19 PERSONAL HISTORY OF OTHER DISEASES OF THE DIGESTIVE SYSTEM: Chronic | ICD-10-CM

## 2023-06-22 DIAGNOSIS — E05.90 THYROTOXICOSIS, UNSPECIFIED WITHOUT THYROTOXIC CRISIS OR STORM: ICD-10-CM

## 2023-06-22 DIAGNOSIS — Z98.890 OTHER SPECIFIED POSTPROCEDURAL STATES: Chronic | ICD-10-CM

## 2023-06-22 PROCEDURE — 78014 THYROID IMAGING W/BLOOD FLOW: CPT | Mod: 26

## 2023-06-26 ENCOUNTER — INPATIENT (INPATIENT)
Facility: HOSPITAL | Age: 68
LOS: 2 days | Discharge: HOME | DRG: 310 | End: 2023-06-29
Attending: INTERNAL MEDICINE | Admitting: INTERNAL MEDICINE
Payer: COMMERCIAL

## 2023-06-26 VITALS
OXYGEN SATURATION: 97 % | HEART RATE: 66 BPM | RESPIRATION RATE: 18 BRPM | SYSTOLIC BLOOD PRESSURE: 140 MMHG | WEIGHT: 214.95 LBS | DIASTOLIC BLOOD PRESSURE: 78 MMHG | TEMPERATURE: 98 F

## 2023-06-26 DIAGNOSIS — Z87.19 PERSONAL HISTORY OF OTHER DISEASES OF THE DIGESTIVE SYSTEM: Chronic | ICD-10-CM

## 2023-06-26 DIAGNOSIS — I48.91 UNSPECIFIED ATRIAL FIBRILLATION: ICD-10-CM

## 2023-06-26 DIAGNOSIS — Z98.890 OTHER SPECIFIED POSTPROCEDURAL STATES: Chronic | ICD-10-CM

## 2023-06-26 LAB
ANION GAP SERPL CALC-SCNC: 2 MMOL/L — LOW (ref 5–17)
ANION GAP SERPL CALC-SCNC: 3 MMOL/L — LOW (ref 5–17)
BUN SERPL-MCNC: 15 MG/DL — SIGNIFICANT CHANGE UP (ref 7–23)
BUN SERPL-MCNC: 18 MG/DL — SIGNIFICANT CHANGE UP (ref 7–23)
CALCIUM SERPL-MCNC: 10 MG/DL — SIGNIFICANT CHANGE UP (ref 8.5–10.1)
CALCIUM SERPL-MCNC: 9.4 MG/DL — SIGNIFICANT CHANGE UP (ref 8.5–10.1)
CHLORIDE SERPL-SCNC: 105 MMOL/L — SIGNIFICANT CHANGE UP (ref 96–108)
CHLORIDE SERPL-SCNC: 105 MMOL/L — SIGNIFICANT CHANGE UP (ref 96–108)
CO2 SERPL-SCNC: 28 MMOL/L — SIGNIFICANT CHANGE UP (ref 22–31)
CO2 SERPL-SCNC: 31 MMOL/L — SIGNIFICANT CHANGE UP (ref 22–31)
CREAT SERPL-MCNC: 0.74 MG/DL — SIGNIFICANT CHANGE UP (ref 0.5–1.3)
CREAT SERPL-MCNC: 0.75 MG/DL — SIGNIFICANT CHANGE UP (ref 0.5–1.3)
EGFR: 98 ML/MIN/1.73M2 — SIGNIFICANT CHANGE UP
EGFR: 98 ML/MIN/1.73M2 — SIGNIFICANT CHANGE UP
GLUCOSE SERPL-MCNC: 100 MG/DL — HIGH (ref 70–99)
GLUCOSE SERPL-MCNC: 92 MG/DL — SIGNIFICANT CHANGE UP (ref 70–99)
MAGNESIUM SERPL-MCNC: 2.1 MG/DL — SIGNIFICANT CHANGE UP (ref 1.6–2.6)
POTASSIUM SERPL-MCNC: 3.8 MMOL/L — SIGNIFICANT CHANGE UP (ref 3.5–5.3)
POTASSIUM SERPL-MCNC: 3.9 MMOL/L — SIGNIFICANT CHANGE UP (ref 3.5–5.3)
POTASSIUM SERPL-SCNC: 3.8 MMOL/L — SIGNIFICANT CHANGE UP (ref 3.5–5.3)
POTASSIUM SERPL-SCNC: 3.9 MMOL/L — SIGNIFICANT CHANGE UP (ref 3.5–5.3)
SODIUM SERPL-SCNC: 136 MMOL/L — SIGNIFICANT CHANGE UP (ref 135–145)
SODIUM SERPL-SCNC: 138 MMOL/L — SIGNIFICANT CHANGE UP (ref 135–145)

## 2023-06-26 PROCEDURE — 83735 ASSAY OF MAGNESIUM: CPT

## 2023-06-26 PROCEDURE — 80048 BASIC METABOLIC PNL TOTAL CA: CPT

## 2023-06-26 PROCEDURE — 93005 ELECTROCARDIOGRAM TRACING: CPT

## 2023-06-26 PROCEDURE — 85027 COMPLETE CBC AUTOMATED: CPT

## 2023-06-26 PROCEDURE — 36415 COLL VENOUS BLD VENIPUNCTURE: CPT

## 2023-06-26 PROCEDURE — 99223 1ST HOSP IP/OBS HIGH 75: CPT

## 2023-06-26 PROCEDURE — 93010 ELECTROCARDIOGRAM REPORT: CPT | Mod: 76

## 2023-06-26 RX ORDER — DOFETILIDE 0.25 MG/1
250 CAPSULE ORAL EVERY 12 HOURS
Refills: 0 | Status: DISCONTINUED | OUTPATIENT
Start: 2023-06-26 | End: 2023-06-29

## 2023-06-26 RX ORDER — LOSARTAN/HYDROCHLOROTHIAZIDE 100MG-25MG
1 TABLET ORAL
Qty: 0 | Refills: 0 | DISCHARGE

## 2023-06-26 RX ORDER — APIXABAN 2.5 MG/1
5 TABLET, FILM COATED ORAL
Refills: 0 | Status: DISCONTINUED | OUTPATIENT
Start: 2023-06-26 | End: 2023-06-29

## 2023-06-26 RX ORDER — ACETAMINOPHEN 500 MG
650 TABLET ORAL EVERY 6 HOURS
Refills: 0 | Status: DISCONTINUED | OUTPATIENT
Start: 2023-06-26 | End: 2023-06-29

## 2023-06-26 RX ORDER — LANOLIN ALCOHOL/MO/W.PET/CERES
3 CREAM (GRAM) TOPICAL AT BEDTIME
Refills: 0 | Status: DISCONTINUED | OUTPATIENT
Start: 2023-06-26 | End: 2023-06-29

## 2023-06-26 RX ORDER — LOSARTAN POTASSIUM 100 MG/1
50 TABLET, FILM COATED ORAL DAILY
Refills: 0 | Status: DISCONTINUED | OUTPATIENT
Start: 2023-06-26 | End: 2023-06-29

## 2023-06-26 RX ORDER — METOPROLOL TARTRATE 50 MG
25 TABLET ORAL
Refills: 0 | Status: DISCONTINUED | OUTPATIENT
Start: 2023-06-26 | End: 2023-06-29

## 2023-06-26 RX ORDER — SIMVASTATIN 20 MG/1
20 TABLET, FILM COATED ORAL AT BEDTIME
Refills: 0 | Status: DISCONTINUED | OUTPATIENT
Start: 2023-06-26 | End: 2023-06-29

## 2023-06-26 RX ORDER — POTASSIUM CHLORIDE 20 MEQ
40 PACKET (EA) ORAL ONCE
Refills: 0 | Status: COMPLETED | OUTPATIENT
Start: 2023-06-26 | End: 2023-06-26

## 2023-06-26 RX ORDER — POTASSIUM CHLORIDE 20 MEQ
20 PACKET (EA) ORAL ONCE
Refills: 0 | Status: COMPLETED | OUTPATIENT
Start: 2023-06-26 | End: 2023-06-26

## 2023-06-26 RX ADMIN — APIXABAN 5 MILLIGRAM(S): 2.5 TABLET, FILM COATED ORAL at 21:04

## 2023-06-26 RX ADMIN — Medication 20 MILLIEQUIVALENT(S): at 20:05

## 2023-06-26 RX ADMIN — Medication 3 MILLIGRAM(S): at 21:04

## 2023-06-26 RX ADMIN — Medication 40 MILLIEQUIVALENT(S): at 16:34

## 2023-06-26 RX ADMIN — Medication 25 MILLIGRAM(S): at 21:03

## 2023-06-26 RX ADMIN — DOFETILIDE 250 MICROGRAM(S): 0.25 CAPSULE ORAL at 20:05

## 2023-06-26 NOTE — H&P CARDIOLOGY - COMMENTS
Patient with symptomatic PAF admitted for initiation of Tikosyn  Start Tikosyn 250mcg PO q 12h  Patient will need EKG 2 hours after each dose for first six doses to monitor QTc  If QTc >500ms hold dose and contact EP for dose adjustment  Avoid other QT prolonging medications while on Tikosyn  Patient will need daily BMP and Magnesium  Keep K>4, Mag >2  Patient on Losartan/Hctz for HTN, discontinue Hctz to avoid electrolyte imbalance, may need to adjust BP meds off Hctz  Continue Losartan daily  ChadsVasc 2, continue Eliquis uninterrupted Patient with symptomatic PAF admitted for initiation of Tikosyn  Start Tikosyn 250mcg PO q 12h  Patient will need EKG 2 hours after each dose for first six doses to monitor QTc  If QTc >500ms hold dose and contact EP for dose adjustment  Avoid other QT prolonging medications while on Tikosyn  Patient will need daily BMP and Magnesium  Keep K>4, Mag >2  K 3.9 on admission, Potassium 40meq PO given, repeat K 3.8.  Spoke with pharmacist, Dena and can proceed with starting Tikosyn  Will give additional Potassium 20meq PO and recheck labs in AM  Patient on Losartan/Hctz for HTN, discontinue Hctz to avoid electrolyte imbalance, may need to adjust BP meds off Hctz  Continue Losartan daily  ChadsVasc 2, continue Eliquis uninterrupted

## 2023-06-26 NOTE — H&P CARDIOLOGY - HISTORY OF PRESENT ILLNESS
67 year old male with history of HTN, hyperthyroid, psoriasis, HLD, Afib s/p ablation 4/2022.  Patient has had recurrence of symptomatic PAF and is unable to tolerate Multaq due to nausea.  Patient has been maintained on Metoprolol and Eliquis.  Patient presents today for admission for initiation of Tikosyn.  Denies any CP, palpitations, SOB, dizziness, lightheadedness.

## 2023-06-26 NOTE — H&P CARDIOLOGY - NSICDXPASTMEDICALHX_GEN_ALL_CORE_FT
PAST MEDICAL HISTORY:  Afib     At risk for sleep apnea Bang score 3    Epigastric hernia     HTN (hypertension)     Hypercholesterolemia     Psoriasis     Psoriasis

## 2023-06-26 NOTE — PATIENT PROFILE ADULT - FALL HARM RISK - UNIVERSAL INTERVENTIONS
Bed in lowest position, wheels locked, appropriate side rails in place/Call bell, personal items and telephone in reach/Instruct patient to call for assistance before getting out of bed or chair/Non-slip footwear when patient is out of bed/[place] to call system/Physically safe environment - no spills, clutter or unnecessary equipment/Purposeful Proactive Rounding/Room/bathroom lighting operational, light cord in reach

## 2023-06-27 LAB
ANION GAP SERPL CALC-SCNC: 3 MMOL/L — LOW (ref 5–17)
BUN SERPL-MCNC: 13 MG/DL — SIGNIFICANT CHANGE UP (ref 7–23)
CALCIUM SERPL-MCNC: 9.5 MG/DL — SIGNIFICANT CHANGE UP (ref 8.5–10.1)
CHLORIDE SERPL-SCNC: 108 MMOL/L — SIGNIFICANT CHANGE UP (ref 96–108)
CO2 SERPL-SCNC: 29 MMOL/L — SIGNIFICANT CHANGE UP (ref 22–31)
CREAT SERPL-MCNC: 0.72 MG/DL — SIGNIFICANT CHANGE UP (ref 0.5–1.3)
EGFR: 99 ML/MIN/1.73M2 — SIGNIFICANT CHANGE UP
GLUCOSE SERPL-MCNC: 96 MG/DL — SIGNIFICANT CHANGE UP (ref 70–99)
HCT VFR BLD CALC: 41.1 % — SIGNIFICANT CHANGE UP (ref 39–50)
HGB BLD-MCNC: 14.1 G/DL — SIGNIFICANT CHANGE UP (ref 13–17)
MAGNESIUM SERPL-MCNC: 2.1 MG/DL — SIGNIFICANT CHANGE UP (ref 1.6–2.6)
MCHC RBC-ENTMCNC: 30.5 PG — SIGNIFICANT CHANGE UP (ref 27–34)
MCHC RBC-ENTMCNC: 34.3 GM/DL — SIGNIFICANT CHANGE UP (ref 32–36)
MCV RBC AUTO: 88.8 FL — SIGNIFICANT CHANGE UP (ref 80–100)
PLATELET # BLD AUTO: 145 K/UL — LOW (ref 150–400)
POTASSIUM SERPL-MCNC: 4.6 MMOL/L — SIGNIFICANT CHANGE UP (ref 3.5–5.3)
POTASSIUM SERPL-SCNC: 4.6 MMOL/L — SIGNIFICANT CHANGE UP (ref 3.5–5.3)
RBC # BLD: 4.63 M/UL — SIGNIFICANT CHANGE UP (ref 4.2–5.8)
RBC # FLD: 12.1 % — SIGNIFICANT CHANGE UP (ref 10.3–14.5)
SODIUM SERPL-SCNC: 140 MMOL/L — SIGNIFICANT CHANGE UP (ref 135–145)
WBC # BLD: 5.6 K/UL — SIGNIFICANT CHANGE UP (ref 3.8–10.5)
WBC # FLD AUTO: 5.6 K/UL — SIGNIFICANT CHANGE UP (ref 3.8–10.5)

## 2023-06-27 PROCEDURE — 93010 ELECTROCARDIOGRAM REPORT: CPT

## 2023-06-27 RX ORDER — METOPROLOL TARTRATE 50 MG
1 TABLET ORAL
Refills: 0 | DISCHARGE

## 2023-06-27 RX ORDER — SIMVASTATIN 20 MG/1
1 TABLET, FILM COATED ORAL
Qty: 0 | Refills: 0 | DISCHARGE

## 2023-06-27 RX ORDER — APREMILAST 10-20-30MG
1 KIT ORAL
Qty: 0 | Refills: 0 | DISCHARGE

## 2023-06-27 RX ADMIN — DOFETILIDE 250 MICROGRAM(S): 0.25 CAPSULE ORAL at 20:03

## 2023-06-27 RX ADMIN — Medication 25 MILLIGRAM(S): at 09:33

## 2023-06-27 RX ADMIN — SIMVASTATIN 20 MILLIGRAM(S): 20 TABLET, FILM COATED ORAL at 21:36

## 2023-06-27 RX ADMIN — Medication 3 MILLIGRAM(S): at 21:34

## 2023-06-27 RX ADMIN — APIXABAN 5 MILLIGRAM(S): 2.5 TABLET, FILM COATED ORAL at 09:33

## 2023-06-27 RX ADMIN — LOSARTAN POTASSIUM 50 MILLIGRAM(S): 100 TABLET, FILM COATED ORAL at 09:33

## 2023-06-27 RX ADMIN — DOFETILIDE 250 MICROGRAM(S): 0.25 CAPSULE ORAL at 08:01

## 2023-06-27 RX ADMIN — APIXABAN 5 MILLIGRAM(S): 2.5 TABLET, FILM COATED ORAL at 21:34

## 2023-06-27 RX ADMIN — Medication 25 MILLIGRAM(S): at 21:34

## 2023-06-27 NOTE — PROGRESS NOTE ADULT - ASSESSMENT
67 year old male with history of HTN, hyperthyroid, psoriasis, HLD, Afib s/p ablation 4/2022 with recurrence of symptomatic PAF admitted for initiation of Tikosyn    continue Tikosyn 250mcg PO q 12h - tolerating well QTc after 1st dose 419ms  Patient will need EKG 2 hours after each dose for first six doses to monitor QTc  If QTc >500ms hold dose and contact EP for dose adjustment  Avoid other QT prolonging medications while on Tikosyn  daily BMP and Magnesium  Keep K>4, Mag >2, K 3.9 on admission, Potassium 40meq PO given -- corrected to 4.6 this am   Patient on Losartan/Hctz for HTN, Hctz to avoid electrolyte imbalance, monitor BP may need to adjust htn meds off Hctz -- Continue Losartan daily  ChadsVasc 2, continue Eliquis uninterrupted.

## 2023-06-27 NOTE — PROGRESS NOTE ADULT - SUBJECTIVE AND OBJECTIVE BOX
HPI:  67 year old male with history of HTN, hyperthyroid, psoriasis, HLD, Afib s/p ablation 2022.  Patient has had recurrence of symptomatic PAF and is unable to tolerate Multaq due to nausea.  Patient has been maintained on Metoprolol and Eliquis.  Patient presents today for admission for initiation of Tikosyn.  Denies any CP, palpitations, SOB, dizziness, lightheadedness. (2023 15:36)      Subjective:    EK2023 @2213 sinus rhythm 63bpm, Dmitry 180ms, QRS 98ms, [id number]  TELE: sinus rhythm 60-70bpm     MEDICATIONS  (STANDING):  apixaban 5 milliGRAM(s) Oral two times a day  dofetilide. 250 MICROGram(s) Oral every 12 hours  losartan 50 milliGRAM(s) Oral daily  metoprolol tartrate 25 milliGRAM(s) Oral two times a day  simvastatin 20 milliGRAM(s) Oral at bedtime    MEDICATIONS  (PRN):  acetaminophen     Tablet .. 650 milliGRAM(s) Oral every 6 hours PRN Temp greater or equal to 38C (100.4F), Mild Pain (1 - 3)  aluminum hydroxide/magnesium hydroxide/simethicone Suspension 30 milliLiter(s) Oral every 4 hours PRN Dyspepsia  melatonin 3 milliGRAM(s) Oral at bedtime PRN Insomnia      Allergies    No Known Allergies      Vital Signs Last 24 Hrs  T(C): 36.5 (2023 07:10), Max: 36.9 (2023 20:39)  T(F): 97.7 (2023 07:10), Max: 98.5 (2023 20:39)  HR: 65 (2023 07:10) (65 - 66)  BP: 126/64 (2023 07:10) (126/64 - 150/72)  BP(mean): 92 (2023 20:39) (92 - 92)  RR: 19 (2023 07:10) (18 - 19)  SpO2: 96% (2023 07:10) (96% - 97%)    Parameters below as of 2023 07:10  Patient On (Oxygen Delivery Method): room air        PHYSICAL EXAMINATION:  GENERAL: In no apparent distress, well nourished, and hydrated.  HEAD:  Atraumatic, Normocephalic  EYES: EOMI, PERRLA, conjunctiva and sclera clear  ENMT: No tonsillar erythema, exudates, or enlargement; Moist mucous membranes, Good dentition, No lesions  NECK: Supple and normal thyroid.  No JVD or carotid bruit.  Carotid pulse is 2+ bilaterally.  HEART: Regular rate and rhythm; No murmurs, rubs, or gallops.  PULMONARY: Clear to auscultation and perfusion.  No rales, wheezing, or rhonchi bilaterally.  ABDOMEN: Soft, Nontender, Nondistended; Bowel sounds present  EXTREMITIES:  2+ Peripheral Pulses, No clubbing, cyanosis, or edema  LYMPH: No lymphadenopathy noted  NEUROLOGICAL: Grossly nonfocal    LABS:                        14.1   5.60  )-----------( 145      ( 2023 06:11 )             41.1         140  |  108  |  13  ----------------------------<  96  4.6   |  29  |  0.72    Ca    9.5      2023 06:11  Mg     2.1             Urinalysis Basic - ( 2023 06:11 )    Color: x / Appearance: x / SG: x / pH: x  Gluc: 96 mg/dL / Ketone: x  / Bili: x / Urobili: x   Blood: x / Protein: x / Nitrite: x   Leuk Esterase: x / RBC: x / WBC x   Sq Epi: x / Non Sq Epi: x / Bacteria: x            RADIOLOGY & ADDITIONAL TESTS:    ASMIAT 2022   Impression     Summary     Mild (1+) mitral regurgitation is present.   The mitral valve leaflets appear thin and leaflet motion is normal.   The aortic valve is well visualized, appears normal. Valve opening seems to be normal.   The tricuspid valve leaflets are thin and pliable; valve motion is normal.   Pulmonic valve not well seen.   The left atrium appears normal.   No thrombus seen in the left atrial appendage. MYRON velocity was 30cm/sec.   A saline contrast study was performed and showed no evidence of a patent foramen ovale.   Normal left ventricular systolic function.   The left ventricle is normal in size.     Signature     ----------------------------------------------------------------   Electronically signed by Marielos Perkins MD([facility]   physician) on 2022 10:46 AM   ----------------------------------------------------------------   HPI:  67 year old male with history of HTN, hyperthyroid, psoriasis, HLD, Afib s/p ablation 2022.  Patient has had recurrence of symptomatic PAF and is unable to tolerate Multaq due to nausea.  Patient has been maintained on Metoprolol and Eliquis.  Patient presents today for admission for initiation of Tikosyn.  Denies any CP, palpitations, SOB, dizziness, lightheadedness. (2023 15:36)      Subjective: pt denies any symptoms, feeling well, tolerating tikosyn without side effects.     EK2023 @2213 sinus rhythm 63bpm, Dmitry 180ms, QRS 98ms, [id number]  TELE: sinus rhythm 60-70bpm     MEDICATIONS  (STANDING):  apixaban 5 milliGRAM(s) Oral two times a day  dofetilide. 250 MICROGram(s) Oral every 12 hours  losartan 50 milliGRAM(s) Oral daily  metoprolol tartrate 25 milliGRAM(s) Oral two times a day  simvastatin 20 milliGRAM(s) Oral at bedtime    MEDICATIONS  (PRN):  acetaminophen     Tablet .. 650 milliGRAM(s) Oral every 6 hours PRN Temp greater or equal to 38C (100.4F), Mild Pain (1 - 3)  aluminum hydroxide/magnesium hydroxide/simethicone Suspension 30 milliLiter(s) Oral every 4 hours PRN Dyspepsia  melatonin 3 milliGRAM(s) Oral at bedtime PRN Insomnia      Allergies    No Known Allergies      Vital Signs Last 24 Hrs  T(C): 36.5 (2023 07:10), Max: 36.9 (2023 20:39)  T(F): 97.7 (2023 07:10), Max: 98.5 (2023 20:39)  HR: 65 (2023 07:10) (65 - 66)  BP: 126/64 (2023 07:10) (126/64 - 150/72)  BP(mean): 92 (2023 20:39) (92 - 92)  RR: 19 (2023 07:10) (18 - 19)  SpO2: 96% (2023 07:10) (96% - 97%)    Parameters below as of 2023 07:10  Patient On (Oxygen Delivery Method): room air        PHYSICAL EXAMINATION:  GENERAL: In no apparent distress, well nourished, and hydrated.  HEAD:  Atraumatic, Normocephalic  EYES: EOMI, PERRLA, conjunctiva and sclera clear  ENMT: No tonsillar erythema, exudates, or enlargement; Moist mucous membranes, Good dentition, No lesions  NECK: Supple and normal thyroid.  No JVD or carotid bruit.  Carotid pulse is 2+ bilaterally.  HEART: Regular rate and rhythm; No murmurs, rubs, or gallops.  PULMONARY: Clear to auscultation and perfusion.  No rales, wheezing, or rhonchi bilaterally.  ABDOMEN: Soft, Nontender, Nondistended; Bowel sounds present  EXTREMITIES:  2+ Peripheral Pulses, No clubbing, cyanosis, or edema  LYMPH: No lymphadenopathy noted  NEUROLOGICAL: Grossly nonfocal    LABS:                        14.1   5.60  )-----------( 145      ( 2023 06:11 )             41.1         140  |  108  |  13  ----------------------------<  96  4.6   |  29  |  0.72    Ca    9.5      2023 06:11  Mg     2.1             Urinalysis Basic - ( 2023 06:11 )    Color: x / Appearance: x / SG: x / pH: x  Gluc: 96 mg/dL / Ketone: x  / Bili: x / Urobili: x   Blood: x / Protein: x / Nitrite: x   Leuk Esterase: x / RBC: x / WBC x   Sq Epi: x / Non Sq Epi: x / Bacteria: x            RADIOLOGY & ADDITIONAL TESTS:    ASMITA 2022   Impression     Summary     Mild (1+) mitral regurgitation is present.   The mitral valve leaflets appear thin and leaflet motion is normal.   The aortic valve is well visualized, appears normal. Valve opening seems to be normal.   The tricuspid valve leaflets are thin and pliable; valve motion is normal.   Pulmonic valve not well seen.   The left atrium appears normal.   No thrombus seen in the left atrial appendage. MYRON velocity was 30cm/sec.   A saline contrast study was performed and showed no evidence of a patent foramen ovale.   Normal left ventricular systolic function.   The left ventricle is normal in size.     Signature     ----------------------------------------------------------------   Electronically signed by [facility]   [facility]) on 2022 10:46 AM   ----------------------------------------------------------------

## 2023-06-28 LAB
ANION GAP SERPL CALC-SCNC: 2 MMOL/L — LOW (ref 5–17)
BUN SERPL-MCNC: 12 MG/DL — SIGNIFICANT CHANGE UP (ref 7–23)
CALCIUM SERPL-MCNC: 9.5 MG/DL — SIGNIFICANT CHANGE UP (ref 8.5–10.1)
CHLORIDE SERPL-SCNC: 109 MMOL/L — HIGH (ref 96–108)
CO2 SERPL-SCNC: 30 MMOL/L — SIGNIFICANT CHANGE UP (ref 22–31)
CREAT SERPL-MCNC: 0.68 MG/DL — SIGNIFICANT CHANGE UP (ref 0.5–1.3)
EGFR: 101 ML/MIN/1.73M2 — SIGNIFICANT CHANGE UP
GLUCOSE SERPL-MCNC: 99 MG/DL — SIGNIFICANT CHANGE UP (ref 70–99)
MAGNESIUM SERPL-MCNC: 2.2 MG/DL — SIGNIFICANT CHANGE UP (ref 1.6–2.6)
POTASSIUM SERPL-MCNC: 4.6 MMOL/L — SIGNIFICANT CHANGE UP (ref 3.5–5.3)
POTASSIUM SERPL-SCNC: 4.6 MMOL/L — SIGNIFICANT CHANGE UP (ref 3.5–5.3)
SODIUM SERPL-SCNC: 141 MMOL/L — SIGNIFICANT CHANGE UP (ref 135–145)

## 2023-06-28 PROCEDURE — 93010 ELECTROCARDIOGRAM REPORT: CPT

## 2023-06-28 RX ADMIN — Medication 3 MILLIGRAM(S): at 21:08

## 2023-06-28 RX ADMIN — DOFETILIDE 250 MICROGRAM(S): 0.25 CAPSULE ORAL at 08:08

## 2023-06-28 RX ADMIN — Medication 25 MILLIGRAM(S): at 09:16

## 2023-06-28 RX ADMIN — APIXABAN 5 MILLIGRAM(S): 2.5 TABLET, FILM COATED ORAL at 09:17

## 2023-06-28 RX ADMIN — SIMVASTATIN 20 MILLIGRAM(S): 20 TABLET, FILM COATED ORAL at 21:07

## 2023-06-28 RX ADMIN — Medication 25 MILLIGRAM(S): at 21:07

## 2023-06-28 RX ADMIN — APIXABAN 5 MILLIGRAM(S): 2.5 TABLET, FILM COATED ORAL at 21:07

## 2023-06-28 RX ADMIN — LOSARTAN POTASSIUM 50 MILLIGRAM(S): 100 TABLET, FILM COATED ORAL at 09:16

## 2023-06-28 RX ADMIN — DOFETILIDE 250 MICROGRAM(S): 0.25 CAPSULE ORAL at 19:53

## 2023-06-28 NOTE — PROGRESS NOTE ADULT - SUBJECTIVE AND OBJECTIVE BOX
HPI: 67 year old male with history of HTN, hyperthyroid, psoriasis, HLD, Afib s/p ablation 2022.  Patient has had recurrence of symptomatic PAF and is unable to tolerate Multaq due to nausea.  Patient has been maintained on Metoprolol and Eliquis.  Patient presents today for admission for initiation of Tikosyn.  Denies any CP, palpitations, SOB, dizziness, lightheadedness. (2023 15:36)    23  EKG: @2213 sinus rhythm 63bpm, Dmitry 180ms, QRS 98ms, [id number]  TELE: sinus rhythm 60-70bpm       Subjective: pt reports feeling well, denies any symptoms    EK23 @ 2213 sinus rhythm, 62bpm, Dmitry 182ms, QRS 94ms, QTc 428ms  TELE: sinus rhythm in 60s, occasional nocturnal bradycardia in mid 40s    MEDICATIONS  (STANDING):  apixaban 5 milliGRAM(s) Oral two times a day  dofetilide. 250 MICROGram(s) Oral every 12 hours  losartan 50 milliGRAM(s) Oral daily  metoprolol tartrate 25 milliGRAM(s) Oral two times a day  simvastatin 20 milliGRAM(s) Oral at bedtime    MEDICATIONS  (PRN):  acetaminophen     Tablet .. 650 milliGRAM(s) Oral every 6 hours PRN Temp greater or equal to 38C (100.4F), Mild Pain (1 - 3)  aluminum hydroxide/magnesium hydroxide/simethicone Suspension 30 milliLiter(s) Oral every 4 hours PRN Dyspepsia  melatonin 3 milliGRAM(s) Oral at bedtime PRN Insomnia      Allergies    No Known Allergies      Vital Signs Last 24 Hrs  T(C): 36.4 (2023 07:40), Max: 36.5 (2023 20:40)  T(F): 97.5 (2023 07:40), Max: 97.7 (2023 20:40)  HR: 60 (2023 07:40) (60 - 65)  BP: 129/70 (2023 07:40) (129/70 - 136/69)  BP(mean): --  RR: 18 (2023 07:40) (18 - 19)  SpO2: 97% (2023 07:40) (95% - 97%)    Parameters below as of 2023 07:40  Patient On (Oxygen Delivery Method): room air        PHYSICAL EXAMINATION:  GENERAL: In no apparent distress, well nourished, and hydrated.  HEAD:  Atraumatic, Normocephalic  EYES: EOMI, PERRLA, conjunctiva and sclera clear  ENMT: No tonsillar erythema, exudates, or enlargement; Moist mucous membranes, Good dentition, No lesions  NECK: Supple and normal thyroid.  No JVD or carotid bruit.  Carotid pulse is 2+ bilaterally.  HEART: Regular rate and rhythm; No murmurs, rubs, or gallops.  PULMONARY: Clear to auscultation and perfusion.  No rales, wheezing, or rhonchi bilaterally.  ABDOMEN: Soft, Nontender, Nondistended; Bowel sounds present  EXTREMITIES:  2+ Peripheral Pulses, No clubbing, cyanosis, or edema  LYMPH: No lymphadenopathy noted  NEUROLOGICAL: Grossly nonfocal    LABS:                        14.1   5.60  )-----------( 145      ( 2023 06:11 )             41.1         141  |  109<H>  |  12  ----------------------------<  99  4.6   |  30  |  0.68    Ca    9.5      2023 06:30  Mg     2.2             Urinalysis Basic - ( 2023 06:30 )    Color: x / Appearance: x / SG: x / pH: x  Gluc: 99 mg/dL / Ketone: x  / Bili: x / Urobili: x   Blood: x / Protein: x / Nitrite: x   Leuk Esterase: x / RBC: x / WBC x   Sq Epi: x / Non Sq Epi: x / Bacteria: x

## 2023-06-29 ENCOUNTER — TRANSCRIPTION ENCOUNTER (OUTPATIENT)
Age: 68
End: 2023-06-29

## 2023-06-29 VITALS
DIASTOLIC BLOOD PRESSURE: 71 MMHG | HEART RATE: 63 BPM | SYSTOLIC BLOOD PRESSURE: 124 MMHG | OXYGEN SATURATION: 96 % | TEMPERATURE: 98 F | RESPIRATION RATE: 18 BRPM

## 2023-06-29 LAB
ANION GAP SERPL CALC-SCNC: 6 MMOL/L — SIGNIFICANT CHANGE UP (ref 5–17)
BUN SERPL-MCNC: 12 MG/DL — SIGNIFICANT CHANGE UP (ref 7–23)
CALCIUM SERPL-MCNC: 9.7 MG/DL — SIGNIFICANT CHANGE UP (ref 8.5–10.1)
CHLORIDE SERPL-SCNC: 107 MMOL/L — SIGNIFICANT CHANGE UP (ref 96–108)
CO2 SERPL-SCNC: 26 MMOL/L — SIGNIFICANT CHANGE UP (ref 22–31)
CREAT SERPL-MCNC: 0.7 MG/DL — SIGNIFICANT CHANGE UP (ref 0.5–1.3)
EGFR: 100 ML/MIN/1.73M2 — SIGNIFICANT CHANGE UP
GLUCOSE SERPL-MCNC: 102 MG/DL — HIGH (ref 70–99)
MAGNESIUM SERPL-MCNC: 2.2 MG/DL — SIGNIFICANT CHANGE UP (ref 1.6–2.6)
POTASSIUM SERPL-MCNC: 4.2 MMOL/L — SIGNIFICANT CHANGE UP (ref 3.5–5.3)
POTASSIUM SERPL-SCNC: 4.2 MMOL/L — SIGNIFICANT CHANGE UP (ref 3.5–5.3)
SODIUM SERPL-SCNC: 139 MMOL/L — SIGNIFICANT CHANGE UP (ref 135–145)

## 2023-06-29 PROCEDURE — 93010 ELECTROCARDIOGRAM REPORT: CPT

## 2023-06-29 RX ORDER — LOSARTAN POTASSIUM 100 MG/1
1 TABLET, FILM COATED ORAL
Refills: 0 | DISCHARGE

## 2023-06-29 RX ORDER — LOSARTAN POTASSIUM 100 MG/1
1 TABLET, FILM COATED ORAL
Qty: 90 | Refills: 2
Start: 2023-06-29

## 2023-06-29 RX ORDER — APIXABAN 2.5 MG/1
1 TABLET, FILM COATED ORAL
Qty: 0 | Refills: 0 | DISCHARGE

## 2023-06-29 RX ORDER — LOSARTAN POTASSIUM 100 MG/1
1 TABLET, FILM COATED ORAL
Qty: 0 | Refills: 0 | DISCHARGE
Start: 2023-06-29

## 2023-06-29 RX ORDER — DOFETILIDE 0.25 MG/1
1 CAPSULE ORAL
Qty: 180 | Refills: 0
Start: 2023-06-29

## 2023-06-29 RX ORDER — APIXABAN 2.5 MG/1
1 TABLET, FILM COATED ORAL
Qty: 0 | Refills: 0 | DISCHARGE
Start: 2023-06-29

## 2023-06-29 RX ADMIN — Medication 25 MILLIGRAM(S): at 10:29

## 2023-06-29 RX ADMIN — LOSARTAN POTASSIUM 50 MILLIGRAM(S): 100 TABLET, FILM COATED ORAL at 10:29

## 2023-06-29 RX ADMIN — APIXABAN 5 MILLIGRAM(S): 2.5 TABLET, FILM COATED ORAL at 10:29

## 2023-06-29 RX ADMIN — DOFETILIDE 250 MICROGRAM(S): 0.25 CAPSULE ORAL at 08:20

## 2023-06-29 NOTE — DISCHARGE NOTE PROVIDER - HOSPITAL COURSE
HPI:  67 year old male with history of HTN, hyperthyroid, psoriasis, HLD, Afib s/p ablation 2022.  Patient has had recurrence of symptomatic PAF and is unable to tolerate Multaq due to nausea.  Patient has been maintained on Metoprolol and Eliquis.  Patient presents today for admission for initiation of Tikosyn.  Denies any CP, palpitations, SOB, dizziness, lightheadedness. (2023 15:36)      Subjective: pt reports feeling well, denies any symptoms, tolerating tikosyn 250mg bid well     EK2023 952am sinus rhythm 67bpm, Dmitry 178ms, QRS 94ms, [id number]  TELE: sinus rhythm in 60-70bpm, some brief nocturnal bradycardia      MEDICATIONS  (STANDING):  apixaban 5 milliGRAM(s) Oral two times a day  dofetilide. 250 MICROGram(s) Oral every 12 hours  losartan 50 milliGRAM(s) Oral daily  metoprolol tartrate 25 milliGRAM(s) Oral two times a day  simvastatin 20 milliGRAM(s) Oral at bedtime    MEDICATIONS  (PRN):  acetaminophen     Tablet .. 650 milliGRAM(s) Oral every 6 hours PRN Temp greater or equal to 38C (100.4F), Mild Pain (1 - 3)  aluminum hydroxide/magnesium hydroxide/simethicone Suspension 30 milliLiter(s) Oral every 4 hours PRN Dyspepsia  melatonin 3 milliGRAM(s) Oral at bedtime PRN Insomnia      Allergies    No Known Allergies    Vital Signs Last 24 Hrs  T(C): 36.4 (2023 08:02), Max: 36.8 (2023 20:27)  T(F): 97.5 (2023 08:02), Max: 98.3 (2023 20:27)  HR: 63 (2023 08:02) (63 - 71)  BP: 124/71 (2023 08:02) (124/71 - 135/77)  BP(mean): --  RR: 18 (2023 08:02) (18 - 18)  SpO2: 96% (2023 08:02) (96% - 96%)    Parameters below as of 2023 08:02  Patient On (Oxygen Delivery Method): room air        PHYSICAL EXAMINATION:  GENERAL: In no apparent distress, well nourished, and hydrated.  HEAD:  Atraumatic, Normocephalic  EYES: EOMI, PERRLA, conjunctiva and sclera clear  ENMT: No tonsillar erythema, exudates, or enlargement; Moist mucous membranes, Good dentition, No lesions  NECK: Supple and normal thyroid.  No JVD or carotid bruit.  Carotid pulse is 2+ bilaterally.  HEART: Regular rate and rhythm; No murmurs, rubs, or gallops.  PULMONARY: Clear to auscultation and perfusion.  No rales, wheezing, or rhonchi bilaterally.  ABDOMEN: Soft, Nontender, Nondistended; Bowel sounds present  EXTREMITIES:  2+ Peripheral Pulses, No clubbing, cyanosis, or edema  LYMPH: No lymphadenopathy noted  NEUROLOGICAL: Grossly nonfocal    LABS:        139  |  107  |  12  ----------------------------<  102<H>  4.2   |  26  |  0.70    Ca    9.7      2023 06:41  Mg     2.2             ASSESSMENT & PLAN: HPI:  67 year old male with history of HTN, hyperthyroid, psoriasis, HLD, Afib s/p ablation 2022.  Patient has had recurrence of symptomatic PAF and is unable to tolerate Multaq due to nausea.  Patient has been maintained on Metoprolol and Eliquis.  Patient presents today for admission for initiation of Tikosyn.  Denies any CP, palpitations, SOB, dizziness, lightheadedness. (2023 15:36)      Subjective: pt reports feeling well, denies any symptoms, tolerating tikosyn 250mg bid well     EK2023 952am sinus rhythm 67bpm, Dmitry 178ms, QRS 94ms, [id number]  TELE: sinus rhythm in 60-70bpm, some brief nocturnal bradycardia      MEDICATIONS  (STANDING):  apixaban 5 milliGRAM(s) Oral two times a day  dofetilide. 250 MICROGram(s) Oral every 12 hours  losartan 50 milliGRAM(s) Oral daily  metoprolol tartrate 25 milliGRAM(s) Oral two times a day  simvastatin 20 milliGRAM(s) Oral at bedtime    MEDICATIONS  (PRN):  acetaminophen     Tablet .. 650 milliGRAM(s) Oral every 6 hours PRN Temp greater or equal to 38C (100.4F), Mild Pain (1 - 3)  aluminum hydroxide/magnesium hydroxide/simethicone Suspension 30 milliLiter(s) Oral every 4 hours PRN Dyspepsia  melatonin 3 milliGRAM(s) Oral at bedtime PRN Insomnia      Allergies    No Known Allergies    Vital Signs Last 24 Hrs  T(C): 36.4 (2023 08:02), Max: 36.8 (2023 20:27)  T(F): 97.5 (2023 08:02), Max: 98.3 (2023 20:27)  HR: 63 (2023 08:02) (63 - 71)  BP: 124/71 (2023 08:02) (124/71 - 135/77)  BP(mean): --  RR: 18 (2023 08:02) (18 - 18)  SpO2: 96% (2023 08:02) (96% - 96%)    Parameters below as of 2023 08:02  Patient On (Oxygen Delivery Method): room air        PHYSICAL EXAMINATION:  GENERAL: In no apparent distress, well nourished, and hydrated.  HEAD:  Atraumatic, Normocephalic  EYES: EOMI, PERRLA, conjunctiva and sclera clear  ENMT: No tonsillar erythema, exudates, or enlargement; Moist mucous membranes, Good dentition, No lesions  NECK: Supple and normal thyroid.  No JVD or carotid bruit.  Carotid pulse is 2+ bilaterally.  HEART: Regular rate and rhythm; No murmurs, rubs, or gallops.  PULMONARY: Clear to auscultation and perfusion.  No rales, wheezing, or rhonchi bilaterally.  ABDOMEN: Soft, Nontender, Nondistended; Bowel sounds present  EXTREMITIES:  2+ Peripheral Pulses, No clubbing, cyanosis, or edema  LYMPH: No lymphadenopathy noted  NEUROLOGICAL: Grossly nonfocal    LABS:        139  |  107  |  12  ----------------------------<  102<H>  4.2   |  26  |  0.70    Ca    9.7      2023 06:41  Mg     2.2             ASSESSMENT & PLAN:  67 year old male with history of HTN, hyperthyroid, psoriasis, HLD, Afib s/p ablation 2022 with recurrence of symptomatic PAF admitted for initiation of Tikosyn    continue Tikosyn 250mcg PO q 12h   EKG during loading shows stable QTc after 6th dose QTc 414ms,   Avoid other QT prolonging medications while on Tikosyn  discontinue Losartan/Hctz for HTN  Continue Losartan  50mg daily  ChadsVasc 2, continue Eliquis  outpatient follow up in EP clinic in 1 week for EKG

## 2023-06-29 NOTE — DISCHARGE NOTE NURSING/CASE MANAGEMENT/SOCIAL WORK - NSDCPEFALRISK_GEN_ALL_CORE
For information on Fall & Injury Prevention, visit: [email or web address] OR  [email or web address] OR  https://www.cdc.gov/steadi/patient.html

## 2023-06-29 NOTE — DISCHARGE NOTE NURSING/CASE MANAGEMENT/SOCIAL WORK - PATIENT PORTAL LINK FT
You can access the FollowMyHealth Patient Portal offered by [facility] by registering at the following website: [email or web address]. By joining [employer]’s FollowMyHealth portal, you will also be able to view your health information using other applications (apps) compatible with our system.

## 2023-06-29 NOTE — DISCHARGE NOTE PROVIDER - NSDCFUSCHEDAPPT_GEN_ALL_CORE_FT
Marielos Perkins  [facility] Physician [facility]  ELECTROPH [address]  Scheduled Appointment: 09/11/2023    Zane Ledesma  [facility]  NEUROLOGY [address]  Scheduled Appointment: 09/25/2023     [facility]  ELECTROPH [address]  Scheduled Appointment: 07/06/2023    Marielos Perkins  [facility]  ELECTROPH [address]  Scheduled Appointment: 09/11/2023    Zane Ledesma  [facility]  NEUROLOGY [address]  Scheduled Appointment: 09/25/2023

## 2023-06-29 NOTE — DISCHARGE NOTE PROVIDER - CARE PROVIDER_API CALL
Marielos Perkins  Cardiac Electrophysiology  [address] [place] [id number]  Phone: [phone]  Fax: [phone]  Established Patient  Follow Up Time: 1 week   Marielos Perkins  Cardiac Electrophysiology  [address] [place] [id number]  Phone: [phone]  Fax: [phone]  Established Patient  Scheduled Appointment: 07/06/2023 11:30 PM

## 2023-06-29 NOTE — DISCHARGE NOTE PROVIDER - NSDCMRMEDTOKEN_GEN_ALL_CORE_FT
apixaban 5 mg oral tablet: 1 tab(s) orally 2 times a day  dofetilide 250 mcg oral capsule: 1 cap(s) orally every 12 hours  losartan 50 mg oral tablet: 1 tab(s) orally once a day  metoprolol tartrate 25 mg oral tablet: 1 tab(s) orally 2 times a day  Otezla 30 mg oral tablet: 1 tab(s) orally 2 times a day  simvastatin 20 mg oral tablet: 1 tab(s) orally once a day (at bedtime)   apixaban 5 mg oral tablet: 1 tab(s) orally 2 times a day  dofetilide 250 mcg oral capsule: 1 cap(s) orally every 12 hours  losartan 50 mg oral tablet: 1 tab(s) orally once a day  losartan 50 mg oral tablet: 1 tab(s) orally once a day  metoprolol tartrate 25 mg oral tablet: 1 tab(s) orally 2 times a day  Otezla 30 mg oral tablet: 1 tab(s) orally 2 times a day  simvastatin 20 mg oral tablet: 1 tab(s) orally once a day (at bedtime)

## 2023-06-29 NOTE — DISCHARGE NOTE PROVIDER - NSDCCPCAREPLAN_GEN_ALL_CORE_FT
PRINCIPAL DISCHARGE DIAGNOSIS  Diagnosis: PAF (paroxysmal atrial fibrillation)  Assessment and Plan of Treatment:

## 2023-06-29 NOTE — DISCHARGE NOTE PROVIDER - PROVIDER TOKENS
PROVIDER:[TOKEN:[[id number]],FOLLOWUP:[1 week],ESTABLISHEDPATIENT:[T]] PROVIDER:[TOKEN:[[id number]],SCHEDULEDAPPT:[07/06/2023],SCHEDULEDAPPTTIME:[11:30 PM],ESTABLISHEDPATIENT:[T]]

## 2023-06-29 NOTE — DISCHARGE NOTE PROVIDER - ATTENDING ATTESTATION STATEMENT
Patient should come in for BP only check in clinic with medication changes we did at [facility].  Also I do not see documentation of colon cancer screening (was supposed to get cscope in 2014 but then was postponed due to heart attack/stenting).  I believe he ended up getting cologuard but do not recall for sure.  Please inquire. I have personally seen and examined the patient. I have collaborated with and supervised the

## 2023-07-03 ENCOUNTER — TRANSCRIPTION ENCOUNTER (OUTPATIENT)
Age: 68
End: 2023-07-03

## 2023-07-04 DIAGNOSIS — E78.00 PURE HYPERCHOLESTEROLEMIA, UNSPECIFIED: ICD-10-CM

## 2023-07-04 DIAGNOSIS — I10 ESSENTIAL (PRIMARY) HYPERTENSION: ICD-10-CM

## 2023-07-04 DIAGNOSIS — I48.0 PAROXYSMAL ATRIAL FIBRILLATION: ICD-10-CM

## 2023-07-04 DIAGNOSIS — Z88.8 ALLERGY STATUS TO OTHER DRUGS, MEDICAMENTS AND BIOLOGICAL SUBSTANCES: ICD-10-CM

## 2023-07-04 DIAGNOSIS — Z79.01 LONG TERM (CURRENT) USE OF ANTICOAGULANTS: ICD-10-CM

## 2023-07-04 DIAGNOSIS — R00.1 BRADYCARDIA, UNSPECIFIED: ICD-10-CM

## 2023-07-04 DIAGNOSIS — L40.9 PSORIASIS, UNSPECIFIED: ICD-10-CM

## 2023-07-04 DIAGNOSIS — E05.90 THYROTOXICOSIS, UNSPECIFIED WITHOUT THYROTOXIC CRISIS OR STORM: ICD-10-CM

## 2023-07-06 ENCOUNTER — NON-APPOINTMENT (OUTPATIENT)
Age: 68
End: 2023-07-06

## 2023-07-06 ENCOUNTER — OFFICE (OUTPATIENT)
Dept: URBAN - METROPOLITAN AREA CLINIC 102 | Facility: CLINIC | Age: 68
Setting detail: OPHTHALMOLOGY
End: 2023-07-06
Payer: COMMERCIAL

## 2023-07-06 ENCOUNTER — APPOINTMENT (OUTPATIENT)
Dept: ELECTROPHYSIOLOGY | Facility: CLINIC | Age: 68
End: 2023-07-06
Payer: COMMERCIAL

## 2023-07-06 VITALS
HEART RATE: 56 BPM | WEIGHT: 204 LBS | DIASTOLIC BLOOD PRESSURE: 70 MMHG | HEIGHT: 75 IN | SYSTOLIC BLOOD PRESSURE: 137 MMHG | BODY MASS INDEX: 25.36 KG/M2 | OXYGEN SATURATION: 99 % | RESPIRATION RATE: 16 BRPM

## 2023-07-06 DIAGNOSIS — H43.813: ICD-10-CM

## 2023-07-06 PROCEDURE — 93000 ELECTROCARDIOGRAM COMPLETE: CPT

## 2023-07-06 PROCEDURE — 99213 OFFICE O/P EST LOW 20 MIN: CPT | Performed by: OPHTHALMOLOGY

## 2023-07-06 PROCEDURE — 99212 OFFICE O/P EST SF 10 MIN: CPT

## 2023-07-06 ASSESSMENT — KERATOMETRY
OD_K1POWER_DIOPTERS: 38.50
METHOD_AUTO_MANUAL: AUTO
OD_K2POWER_DIOPTERS: 41.25
OS_K1POWER_DIOPTERS: 41.25
OS_K2POWER_DIOPTERS: 42.00
OD_AXISANGLE_DEGREES: 078
OS_AXISANGLE_DEGREES: 075

## 2023-07-06 ASSESSMENT — TONOMETRY
OD_IOP_MMHG: 11
OS_IOP_MMHG: 13

## 2023-07-06 ASSESSMENT — SPHEQUIV_DERIVED
OS_SPHEQUIV: -0.25
OD_SPHEQUIV: 0.5

## 2023-07-06 ASSESSMENT — REFRACTION_AUTOREFRACTION
OS_AXIS: 174
OS_SPHERE: +0.25
OD_SPHERE: +1.75
OD_AXIS: 176
OD_CYLINDER: -2.50
OS_CYLINDER: -1.00

## 2023-07-06 ASSESSMENT — CONFRONTATIONAL VISUAL FIELD TEST (CVF)
OD_FINDINGS: FULL
OS_FINDINGS: FULL

## 2023-07-06 ASSESSMENT — AXIALLENGTH_DERIVED
OD_AL: 24.7861
OS_AL: 24.4051

## 2023-07-06 ASSESSMENT — VISUAL ACUITY
OS_BCVA: 20/25
OD_BCVA: 20/30

## 2023-07-06 NOTE — DISCUSSION/SUMMARY
[FreeTextEntry1] : 67-year-old male with history of hypertension recently diagnoses of persistent atrial fibrillation he had paroxysmal episodes of palpitations several months prior. Symptomatic with exertional dyspnea while in AF. S/p RFCA ([facility] via [facility]) for afib treatment. Patient is on Eliquis 5 mg twice daily denies any bleeding. He has hyperthyroidism, had some unexplained weight loss will follow with endocrinologist for treatment \par -s/p AF ablation doing well remains in SR but has breakthrough pAF symptomatic, s/p in-pt tikosyn load last week, EKG today- QTC WNL. pt is doing well while on tikosyn without PAF episode so far.\par -cont apple watch monitoring\par -reduce alcohol intake \par -cont eliquis 5mg bid and metoprolol 25xl bid\par -pt had endocrine f/u, advised to monitor off treatment for now \par -f/u with  on 9/11/23.\par Plan d/w pt, pt verbalized understandings.  [EKG obtained to assist in diagnosis and management of assessed problem(s)] : EKG obtained to assist in diagnosis and management of assessed problem(s)

## 2023-07-06 NOTE — HISTORY OF PRESENT ILLNESS
[FreeTextEntry1] : 67-year-old male with history of hypertension recently diagnoses of persistent atrial fibrillation he had paroxysmal episodes of palpitations several months prior. Symptomatic with exertional dyspnea while in AF. S/p RFCA ([facility] via [facility]) for afib treatment 4.2022. Patient is on Eliquis 5 mg twice daily denies any bleeding. He underwent ASMITA guided DCCV and started on amiodarone on 1/14/2022 for short term and was stopped. Denies any bleeding. Also on metoprolol XL 25mg daily. He reduced drinking alcohol and had weight loss 10lb. \par He has recurrence of pAF palpitations, he is recently dx with hyperthyroidism, seeing endocrinologist today. TSH <0.01 (6.2.23). Denies cp, dizziness, sob. He did not tolerate Multaq due to nausea. He is taking toprolol xl 25mg bid. \par Pt admitted for in-pt tikosyn load last week, here for 1 weeks f/u & EKG.

## 2023-07-18 ENCOUNTER — TRANSCRIPTION ENCOUNTER (OUTPATIENT)
Age: 68
End: 2023-07-18

## 2023-07-27 ENCOUNTER — TRANSCRIPTION ENCOUNTER (OUTPATIENT)
Age: 68
End: 2023-07-27

## 2023-07-31 RX ORDER — LOSARTAN POTASSIUM AND HYDROCHLOROTHIAZIDE 12.5; 5 MG/1; MG/1
50-12.5 TABLET ORAL DAILY
Refills: 0 | Status: DISCONTINUED | COMMUNITY
End: 2023-07-31

## 2023-08-03 RX ORDER — METOPROLOL SUCCINATE 50 MG/1
50 TABLET, EXTENDED RELEASE ORAL
Qty: 90 | Refills: 4 | Status: DISCONTINUED | COMMUNITY
Start: 2022-01-31 | End: 2023-08-03

## 2023-08-04 RX ORDER — LOSARTAN POTASSIUM 50 MG/1
50 TABLET, FILM COATED ORAL DAILY
Qty: 90 | Refills: 3 | Status: ACTIVE | COMMUNITY
Start: 2023-07-31 | End: 1900-01-01

## 2023-09-11 ENCOUNTER — APPOINTMENT (OUTPATIENT)
Dept: ELECTROPHYSIOLOGY | Facility: CLINIC | Age: 68
End: 2023-09-11
Payer: COMMERCIAL

## 2023-09-11 VITALS
DIASTOLIC BLOOD PRESSURE: 78 MMHG | HEIGHT: 75 IN | WEIGHT: 210 LBS | RESPIRATION RATE: 16 BRPM | SYSTOLIC BLOOD PRESSURE: 131 MMHG | HEART RATE: 79 BPM | OXYGEN SATURATION: 99 % | BODY MASS INDEX: 26.11 KG/M2

## 2023-09-11 DIAGNOSIS — I10 ESSENTIAL (PRIMARY) HYPERTENSION: ICD-10-CM

## 2023-09-11 DIAGNOSIS — I48.91 UNSPECIFIED ATRIAL FIBRILLATION: ICD-10-CM

## 2023-09-11 PROCEDURE — 93000 ELECTROCARDIOGRAM COMPLETE: CPT

## 2023-09-11 PROCEDURE — 99215 OFFICE O/P EST HI 40 MIN: CPT

## 2023-09-25 ENCOUNTER — APPOINTMENT (OUTPATIENT)
Dept: NEUROLOGY | Facility: CLINIC | Age: 68
End: 2023-09-25
Payer: COMMERCIAL

## 2023-09-25 VITALS
WEIGHT: 210 LBS | SYSTOLIC BLOOD PRESSURE: 144 MMHG | HEIGHT: 75 IN | DIASTOLIC BLOOD PRESSURE: 80 MMHG | HEART RATE: 55 BPM | TEMPERATURE: 97.8 F | BODY MASS INDEX: 26.11 KG/M2

## 2023-09-25 DIAGNOSIS — G60.8 OTHER HEREDITARY AND IDIOPATHIC NEUROPATHIES: ICD-10-CM

## 2023-09-25 PROCEDURE — 99214 OFFICE O/P EST MOD 30 MIN: CPT

## 2023-10-05 LAB
ALBUMIN MFR SERPL ELPH: 59.5 %
ALBUMIN SERPL-MCNC: 4.3 G/DL
ALBUMIN/GLOB SERPL: 1.4 RATIO
ALPHA1 GLOB MFR SERPL ELPH: 4.8 %
ALPHA1 GLOB SERPL ELPH-MCNC: 0.4 G/DL
ALPHA2 GLOB MFR SERPL ELPH: 9.3 %
ALPHA2 GLOB SERPL ELPH-MCNC: 0.7 G/DL
B-GLOBULIN MFR SERPL ELPH: 11.5 %
B-GLOBULIN SERPL ELPH-MCNC: 0.8 G/DL
DEPRECATED KAPPA LC FREE/LAMBDA SER: 1.47 RATIO
GAMMA GLOB FLD ELPH-MCNC: 1.1 G/DL
GAMMA GLOB MFR SERPL ELPH: 14.9 %
IGA SER QL IEP: 294 MG/DL
IGG SER QL IEP: 1048 MG/DL
IGM SER QL IEP: 134 MG/DL
INTERPRETATION SERPL IEP-IMP: NORMAL
KAPPA LC CSF-MCNC: 1.8 MG/DL
KAPPA LC SERPL-MCNC: 2.65 MG/DL
M PROTEIN SPEC IFE-MCNC: NORMAL
PROT SERPL-MCNC: 7.3 G/DL
PROT SERPL-MCNC: 7.3 G/DL

## 2023-10-08 LAB
ASIALO-GM1 ANTIBODIES, IGG/IGM: 12 IV
GD1A ANTIBODIES, IGG/IGM: NORMAL IV
GD1B ANTIBODIES, IGG/IGM: 7 IV
GM1 ANTIBODIES, IGG/IGM: 5 IV
GM2 ANTIBODIES, IGG/IGM: 30 IV
GQ1B ANTIBODIES, IGG/IGM: 5 IV

## 2023-10-25 LAB — MAG AB SER QL: NEGATIVE

## 2023-11-07 ENCOUNTER — OFFICE (OUTPATIENT)
Dept: URBAN - METROPOLITAN AREA CLINIC 102 | Facility: CLINIC | Age: 68
Setting detail: OPHTHALMOLOGY
End: 2023-11-07
Payer: COMMERCIAL

## 2023-11-07 DIAGNOSIS — H25.13: ICD-10-CM

## 2023-11-07 DIAGNOSIS — B00.52: ICD-10-CM

## 2023-11-07 DIAGNOSIS — H43.813: ICD-10-CM

## 2023-11-07 DIAGNOSIS — H16.223: ICD-10-CM

## 2023-11-07 PROCEDURE — 92014 COMPRE OPH EXAM EST PT 1/>: CPT | Performed by: OPHTHALMOLOGY

## 2023-11-07 ASSESSMENT — SPHEQUIV_DERIVED
OD_SPHEQUIV: 0.5
OS_SPHEQUIV: -0.125

## 2023-11-07 ASSESSMENT — REFRACTION_AUTOREFRACTION
OD_AXIS: 176
OD_CYLINDER: -2.00
OS_CYLINDER: -0.75
OS_SPHERE: +0.25
OS_AXIS: 164
OD_SPHERE: +1.50

## 2023-11-07 ASSESSMENT — CONFRONTATIONAL VISUAL FIELD TEST (CVF)
OD_FINDINGS: FULL
OS_FINDINGS: FULL

## 2023-11-09 ENCOUNTER — NON-APPOINTMENT (OUTPATIENT)
Age: 68
End: 2023-11-09

## 2023-11-10 ENCOUNTER — APPOINTMENT (OUTPATIENT)
Dept: GASTROENTEROLOGY | Facility: CLINIC | Age: 68
End: 2023-11-10
Payer: COMMERCIAL

## 2023-11-10 VITALS
WEIGHT: 212 LBS | SYSTOLIC BLOOD PRESSURE: 124 MMHG | BODY MASS INDEX: 26.36 KG/M2 | DIASTOLIC BLOOD PRESSURE: 68 MMHG | HEIGHT: 75 IN

## 2023-11-10 DIAGNOSIS — Z12.11 ENCOUNTER FOR SCREENING FOR MALIGNANT NEOPLASM OF COLON: ICD-10-CM

## 2023-11-10 DIAGNOSIS — Z86.010 PERSONAL HISTORY OF COLONIC POLYPS: ICD-10-CM

## 2023-11-10 PROCEDURE — 99213 OFFICE O/P EST LOW 20 MIN: CPT

## 2023-12-19 ENCOUNTER — APPOINTMENT (OUTPATIENT)
Dept: DERMATOLOGY | Facility: CLINIC | Age: 68
End: 2023-12-19
Payer: COMMERCIAL

## 2023-12-19 DIAGNOSIS — D22.9 MELANOCYTIC NEVI, UNSPECIFIED: ICD-10-CM

## 2023-12-19 DIAGNOSIS — L81.4 OTHER MELANIN HYPERPIGMENTATION: ICD-10-CM

## 2023-12-19 DIAGNOSIS — Z12.83 ENCOUNTER FOR SCREENING FOR MALIGNANT NEOPLASM OF SKIN: ICD-10-CM

## 2023-12-19 DIAGNOSIS — L82.0 INFLAMED SEBORRHEIC KERATOSIS: ICD-10-CM

## 2023-12-19 DIAGNOSIS — L40.0 PSORIASIS VULGARIS: ICD-10-CM

## 2023-12-19 PROCEDURE — 99204 OFFICE O/P NEW MOD 45 MIN: CPT | Mod: 25

## 2023-12-19 PROCEDURE — 17110 DESTRUCTION B9 LES UP TO 14: CPT

## 2023-12-19 RX ORDER — APREMILAST 30 MG/1
30 TABLET, FILM COATED ORAL
Qty: 180 | Refills: 3 | Status: ACTIVE | COMMUNITY
Start: 2023-12-19 | End: 1900-01-01

## 2023-12-19 NOTE — HISTORY OF PRESENT ILLNESS
[FreeTextEntry1] : skin check [de-identified] : 68 year old male. darkening growth upper chest. psoriasis.

## 2023-12-19 NOTE — PHYSICAL EXAM
[FreeTextEntry3] : AAOx3, pleasant, NAD, no visual lymphadenopathy hair, scalp, face, nose, eyelids, ears, lips, oropharynx, neck, chest, abdomen, back, right arm, left arm, nails, and hands examined with all normal findings, pertinent findings include:  multiple benign nevi and lentigines psoriatic plaques on b/l elbows, knees + inflamed, waxy, keratotic papule; on chest

## 2023-12-19 NOTE — ASSESSMENT
[FreeTextEntry1] : 1) benign findings as above- education  2) ISK The specified lesions were treated with liquid nitrogen cryotherapy.  Discussed risks including pain, blistering, crusting, discoloration, recurrence.  3) psoriasis discussed biologics defers otezla BID; SED  f/u1 year

## 2023-12-20 ENCOUNTER — NON-APPOINTMENT (OUTPATIENT)
Age: 68
End: 2023-12-20

## 2023-12-25 ENCOUNTER — NON-APPOINTMENT (OUTPATIENT)
Age: 68
End: 2023-12-25

## 2024-01-02 ENCOUNTER — RESULT REVIEW (OUTPATIENT)
Age: 69
End: 2024-01-02

## 2024-01-02 ENCOUNTER — APPOINTMENT (OUTPATIENT)
Dept: GASTROENTEROLOGY | Facility: AMBULATORY MEDICAL SERVICES | Age: 69
End: 2024-01-02
Payer: COMMERCIAL

## 2024-01-02 PROCEDURE — 45385 COLONOSCOPY W/LESION REMOVAL: CPT | Mod: GC

## 2024-01-02 PROCEDURE — 45380 COLONOSCOPY AND BIOPSY: CPT | Mod: GC,59

## 2024-01-09 RX ORDER — APIXABAN 5 MG/1
5 TABLET, FILM COATED ORAL
Qty: 180 | Refills: 3 | Status: ACTIVE | COMMUNITY
Start: 2022-01-03 | End: 1900-01-01

## 2024-02-15 ENCOUNTER — APPOINTMENT (OUTPATIENT)
Dept: ELECTROPHYSIOLOGY | Facility: CLINIC | Age: 69
End: 2024-02-15

## 2024-03-22 ENCOUNTER — RX RENEWAL (OUTPATIENT)
Age: 69
End: 2024-03-22

## 2024-03-22 RX ORDER — DOFETILIDE 0.25 MG/1
250 CAPSULE ORAL TWICE DAILY
Qty: 180 | Refills: 3 | Status: ACTIVE | COMMUNITY
Start: 2023-06-13 | End: 1900-01-01

## 2024-03-26 ENCOUNTER — TRANSCRIPTION ENCOUNTER (OUTPATIENT)
Age: 69
End: 2024-03-26

## 2024-04-19 NOTE — DISCHARGE NOTE PROVIDER - CARE PROVIDER_API CALL
no Marielos Perkins)  Cardiac Electrophysiology; Cardiovascular Disease; Internal Medicine  [address] [place] [address]  Phone: [phone]  Fax: [phone]  Established Patient  Follow Up Time: 1 month

## 2024-05-16 RX ORDER — METOPROLOL TARTRATE 25 MG/1
25 TABLET, FILM COATED ORAL TWICE DAILY
Qty: 180 | Refills: 1 | Status: ACTIVE | COMMUNITY
Start: 1900-01-01 | End: 1900-01-01

## 2024-08-05 ENCOUNTER — APPOINTMENT (OUTPATIENT)
Dept: ELECTROPHYSIOLOGY | Facility: CLINIC | Age: 69
End: 2024-08-05

## 2024-08-05 PROCEDURE — 93000 ELECTROCARDIOGRAM COMPLETE: CPT

## 2024-08-05 PROCEDURE — 99204 OFFICE O/P NEW MOD 45 MIN: CPT

## 2024-08-05 NOTE — HISTORY OF PRESENT ILLNESS
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 S/p RFCA ([facility] via [facility]) for afib treatment 4.2022. Patient is on Eliquis 5 mg twice daily denies any bleeding. He underwent ASMITA guided DCCV, on Tikosyn 250mcg q12. Denies any bleeding. He reduced drinking alcohol and had weight loss 10lb.  He has rare palpitations, uses smartwatch to screen for AF, he had one episode of AF for 3h on May 24'. Denies cp, dizziness, sob, palpitations. He did not tolerate Multaq due to nausea. He is taking toprolol xl 25mg bid.  ecg 8.5.24 SR QTc 410 ms ecg 9.11.23  SR 70 bpm QTc 380ms  ecg 6.13.23 SR 72 bpm ecg 2.16.23 SR 87, narrow qrs ecg 8.12.22 SB 54 bpm ecg 5.6.22 SR 72 bpm, nml QTc ecg 2.10.22 Afib 100 bpm ecg 1.3.22 AF  bpm, narrow qrs

## 2024-08-05 NOTE — DISCUSSION/SUMMARY
[FreeTextEntry1] : 69-year-old male with history of hypertension recently diagnoses of persistent atrial fibrillation he had paroxysmal episodes of palpitations several months prior. Symptomatic with exertional dyspnea while in AF. S/p RFCA ([facility]) for afib treatment. Patient is on Eliquis 5 mg twice daily denies any bleeding. 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  [EKG obtained to assist in diagnosis and management of assessed problem(s)] : EKG obtained to assist in diagnosis and management of assessed problem(s)

## 2024-08-16 ENCOUNTER — RX RENEWAL (OUTPATIENT)
Age: 69
End: 2024-08-16

## 2024-09-09 ENCOUNTER — RX RENEWAL (OUTPATIENT)
Age: 69
End: 2024-09-09